# Patient Record
Sex: MALE | Race: WHITE | NOT HISPANIC OR LATINO | Employment: FULL TIME | URBAN - METROPOLITAN AREA
[De-identification: names, ages, dates, MRNs, and addresses within clinical notes are randomized per-mention and may not be internally consistent; named-entity substitution may affect disease eponyms.]

---

## 2017-02-20 ENCOUNTER — OFFICE VISIT (OUTPATIENT)
Dept: FAMILY MEDICINE | Facility: CLINIC | Age: 52
End: 2017-02-20
Payer: COMMERCIAL

## 2017-02-20 VITALS
DIASTOLIC BLOOD PRESSURE: 81 MMHG | SYSTOLIC BLOOD PRESSURE: 132 MMHG | HEIGHT: 75 IN | BODY MASS INDEX: 38.92 KG/M2 | TEMPERATURE: 97.8 F | OXYGEN SATURATION: 97 % | RESPIRATION RATE: 16 BRPM | HEART RATE: 62 BPM | WEIGHT: 313 LBS

## 2017-02-20 DIAGNOSIS — J02.9 SORE THROAT: Primary | ICD-10-CM

## 2017-02-20 DIAGNOSIS — J01.80 OTHER ACUTE SINUSITIS: ICD-10-CM

## 2017-02-20 LAB
DEPRECATED S PYO AG THROAT QL EIA: NORMAL
MICRO REPORT STATUS: NORMAL
SPECIMEN SOURCE: NORMAL

## 2017-02-20 PROCEDURE — 87081 CULTURE SCREEN ONLY: CPT | Performed by: FAMILY MEDICINE

## 2017-02-20 PROCEDURE — 87880 STREP A ASSAY W/OPTIC: CPT | Performed by: FAMILY MEDICINE

## 2017-02-20 PROCEDURE — 99213 OFFICE O/P EST LOW 20 MIN: CPT | Performed by: FAMILY MEDICINE

## 2017-02-20 RX ORDER — ACAMPROSATE CALCIUM 333 MG/1
666 TABLET, DELAYED RELEASE ORAL DAILY
COMMUNITY
End: 2018-12-07

## 2017-02-20 NOTE — MR AVS SNAPSHOT
After Visit Summary   2/20/2017    Kunal Rao    MRN: [id number]           Patient Information     Date Of Birth          1965        Visit Information        Provider Department      2/20/2017 4:30 PM Rosalina Coffman MD [facility] [place]        Today's Diagnoses     Sore throat    -  1    Other acute sinusitis          Care Instructions    Take medications as directed.  Treatment  and symptomatic cares discussed   Follow up if problem or concern           Follow-ups after your visit        Who to contact     If you have questions or need follow up information about today's clinic visit or your schedule please contact [facility] [place] directly at [phone].  Normal or non-critical lab and imaging results will be communicated to you by MyChart, letter or phone within 4 business days after the clinic has received the results. If you do not hear from us within 7 days, please contact the clinic through [employer] or phone. If you have a critical or abnormal lab result, we will notify you by phone as soon as possible.  Submit refill requests through [employer] or call your pharmacy and they will forward the refill request to us. Please allow 3 business days for your refill to be completed.          Additional Information About Your Visit        MyChart Information     [employer] gives you secure access to your electronic health record. If you see a primary care provider, you can also send messages to your care team and make appointments. If you have questions, please call your primary care clinic.  If you do not have a primary care provider, please call [phone] and they will assist you.        Care EveryWhere ID     This is your Care EveryWhere ID. This could be used by other organizations to access your [place] medical records  [id number]        Your Vitals Were     Pulse Temperature Respirations Height Pulse Oximetry BMI (Body Mass Index)    62 97.8  F (36.6  C)  "(Tympanic) 16 6' 3\" (1.905 m) 97% 39.12 kg/m2       Blood Pressure from Last 3 Encounters:   02/20/17 132/81   12/02/16 115/79   03/02/16 154/90    Weight from Last 3 Encounters:   02/20/17 (!) 313 lb (142 kg)   12/02/16 (!) 311 lb (141.1 kg)   03/02/16 (!) 313 lb (142 kg)              We Performed the Following     Strep, Rapid Screen          Today's Medication Changes          These changes are accurate as of: 2/20/17  5:20 PM.  If you have any questions, ask your nurse or doctor.               Start taking these medicines.        Dose/Directions    amoxicillin-clavulanate 875-125 MG per tablet   Commonly known as:  AUGMENTIN   Used for:  Other acute sinusitis   Started by:  Rosalina Coffman MD        Dose:  1 tablet   Take 1 tablet by mouth 2 times daily   Quantity:  28 tablet   Refills:  0            Where to get your medicines      These medications were sent to [id number] - [place] - [address]  [address] [facility] [place] [address]     Phone:  [phone]     amoxicillin-clavulanate 875-125 MG per tablet                Primary Care Provider Office Phone # Fax #    Tracy Rodriguez PA-C [phone]       [facility] [address] [place] [address] [address]  [place] [address]        Thank you!     Thank you for choosing [facility]  for your care. Our goal is always to provide you with excellent care. Hearing back from our patients is one way we can continue to improve our services. Please take a few minutes to complete the written survey that you may receive in the mail after your visit with us. Thank you!             Your Updated Medication List - Protect others around you: Learn how to safely use, store and throw away your medicines at www.disposemymeds.org.          This list is accurate as of: 2/20/17  5:20 PM.  Always use your most recent med list.                   Brand Name Dispense Instructions for use    " ADDERALL 5 MG per tablet   Generic drug:  amphetamine-dextroamphetamine      Take 5 mg by mouth daily Pt may take 10MG. Pt is unsure       amoxicillin-clavulanate 875-125 MG per tablet    AUGMENTIN    28 tablet    Take 1 tablet by mouth 2 times daily       CAMPRAL 333 MG EC tablet   Generic drug:  acamprosate      Take 666 mg by mouth daily       cholecalciferol 1000 UNIT tablet    vitamin D    100 tablet    Take 1 tablet (1,000 Units) by mouth daily       ferrous sulfate 325 (65 FE) MG tablet    IRON    30 tablet    Take 1 tablet (325 mg) by mouth daily (with breakfast)       LEXAPRO 20 MG tablet   Generic drug:  escitalopram      Take 30 mg by mouth daily       omega 3 1000 MG Caps     90 capsule    Take 1 g by mouth daily       VITAMIN C PO      Take 500 mg by mouth daily

## 2017-02-20 NOTE — PROGRESS NOTES
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  Additional history: as documented    Problem list, Medication list, Allergies, and Medical/Social/Surgical histories reviewed in EPIC and updated as appropriate.    ROS:  Constitutional, HEENT, cardiovascular, pulmonary, GI, , musculoskeletal, neuro, skin, endocrine and psych systems are negative, except as otherwise noted.    OBJECTIVE:                                                    /81 (Cuff Size: Adult Large)  Pulse 62  Temp 97.8  F (36.6  C) (Tympanic)  Resp 16  Ht 6' 3\" (1.905 m)  Wt (!) 313 lb (142 kg)  SpO2 97%  BMI 39.12 kg/m2  Body mass index is 39.12 kg/(m^2).  GENERAL: healthy, alert and no distress  EYES: Eyes grossly normal to inspection, conjunctivae and sclerae normal  HENT: ear canals and TM's normal,  mouth without ulcers or lesions and oral mucous membranes moist. Nose with purulent rhinorrhea, Throat with mild pharyngeal erythema, +ve  sinus  Tenderness, more so on rt then left   NECK: no adenopathy,   RESP: lungs clear to auscultation - no rales, rhonchi or wheezes  CV: regular rate and rhythm, normal S1 S2, no S3 or S4, no murmur, click or rub, no peripheral edema and peripheral pulses strong    Diagnostic Test Results:  Strep screen - Negative "     ASSESSMENT/PLAN:                                                      (J02.9) Sore throat  (primary encounter diagnosis)  Comment:   Plan: Strep, Rapid Screen, Beta strep group A culture          Rapid strep negative, strep culture pending. Inform pt  if positive.      (J01.80) Other acute sinusitis  Comment:   Plan: amoxicillin-clavulanate (AUGMENTIN) 875-125 MG         per tablet               URI lingering onto sinusitis.script faxed.  Cares and symptomatic treatment discussed. Follow up if problem.       Patient expressed understanding and agreement with treatment plan. All patient's questions were answered, will let me know if has more later.  Medications: Rx's: Reviewed the potential side effects/complications of medications prescribed.       Rosalina Coffman MD  [facility]

## 2017-02-20 NOTE — NURSING NOTE
"Chief Complaint   Patient presents with     URI       Initial /81 (Cuff Size: Adult Large)  Pulse 62  Temp 97.8  F (36.6  C) (Tympanic)  Resp 16  Ht 6' 3\" (1.905 m)  Wt (!) 313 lb (142 kg)  SpO2 97%  BMI 39.12 kg/m2 Estimated body mass index is 39.12 kg/(m^2) as calculated from the following:    Height as of this encounter: 6' 3\" (1.905 m).    Weight as of this encounter: 313 lb (142 kg).  Medication Reconciliation: complete     Trista Oconnell, CMA    "

## 2017-02-22 LAB
BACTERIA SPEC CULT: NORMAL
MICRO REPORT STATUS: NORMAL
SPECIMEN SOURCE: NORMAL

## 2017-02-22 ASSESSMENT — ANXIETY QUESTIONNAIRES
6. BECOMING EASILY ANNOYED OR IRRITABLE: SEVERAL DAYS
1. FEELING NERVOUS, ANXIOUS, OR ON EDGE: SEVERAL DAYS
3. WORRYING TOO MUCH ABOUT DIFFERENT THINGS: MORE THAN HALF THE DAYS
GAD7 TOTAL SCORE: 6
IF YOU CHECKED OFF ANY PROBLEMS ON THIS QUESTIONNAIRE, HOW DIFFICULT HAVE THESE PROBLEMS MADE IT FOR YOU TO DO YOUR WORK, TAKE CARE OF THINGS AT HOME, OR GET ALONG WITH OTHER PEOPLE: SOMEWHAT DIFFICULT
7. FEELING AFRAID AS IF SOMETHING AWFUL MIGHT HAPPEN: SEVERAL DAYS
2. NOT BEING ABLE TO STOP OR CONTROL WORRYING: NOT AT ALL
5. BEING SO RESTLESS THAT IT IS HARD TO SIT STILL: NOT AT ALL

## 2017-02-22 ASSESSMENT — PATIENT HEALTH QUESTIONNAIRE - PHQ9: 5. POOR APPETITE OR OVEREATING: SEVERAL DAYS

## 2017-02-23 ASSESSMENT — ANXIETY QUESTIONNAIRES: GAD7 TOTAL SCORE: 6

## 2017-02-23 ASSESSMENT — PATIENT HEALTH QUESTIONNAIRE - PHQ9: SUM OF ALL RESPONSES TO PHQ QUESTIONS 1-9: 11

## 2017-10-19 ENCOUNTER — RADIANT APPOINTMENT (OUTPATIENT)
Dept: GENERAL RADIOLOGY | Facility: CLINIC | Age: 52
End: 2017-10-19
Attending: FAMILY MEDICINE
Payer: COMMERCIAL

## 2017-10-19 ENCOUNTER — OFFICE VISIT (OUTPATIENT)
Dept: ORTHOPEDICS | Facility: CLINIC | Age: 52
End: 2017-10-19
Payer: COMMERCIAL

## 2017-10-19 VITALS
WEIGHT: 278 LBS | BODY MASS INDEX: 34.57 KG/M2 | DIASTOLIC BLOOD PRESSURE: 74 MMHG | HEIGHT: 75 IN | SYSTOLIC BLOOD PRESSURE: 118 MMHG

## 2017-10-19 DIAGNOSIS — M25.571 PAIN IN JOINT INVOLVING RIGHT ANKLE AND FOOT: Primary | ICD-10-CM

## 2017-10-19 DIAGNOSIS — M25.571 PAIN IN JOINT INVOLVING RIGHT ANKLE AND FOOT: ICD-10-CM

## 2017-10-19 PROCEDURE — 99203 OFFICE O/P NEW LOW 30 MIN: CPT | Performed by: FAMILY MEDICINE

## 2017-10-19 PROCEDURE — 73630 X-RAY EXAM OF FOOT: CPT | Mod: RT | Performed by: FAMILY MEDICINE

## 2017-10-19 NOTE — PROGRESS NOTES
[facility]   Clinic Visit s Oct 19, 2017    PCP: Tracy Rodriguez Margaret      Kunal is a 52 year old male who is seen as self referral for   Chief Complaint   Patient presents with     Foot Injury       Injury: Reports insidious onset without acute precipitating event. Patient is suspicious that this injury could have been caused by an increase in walking over the summer.     Location of Pain: right heel  Duration of Pain: 6 week(s)  Rating of Pain at worst: 5/10  Rating of Pain Currently: 1/10  Pain is worse with: increased physical activity  Pain is better with: rest  Treatment so far consists of: rest/activity avoidance  Associated symptoms: no distal numbness or tingling; denies swelling or warmth  Prior History of related problems: none    Social History: works at      Past Medical History:   Diagnosis Date     ADD (attention deficit disorder)      Kidney donor     pt donated kidney for transplantation     Major depression, recurrent (H)      THUMB FRACTURE     fracture left thumb, closed reduction       Patient Active Problem List    Diagnosis Date Noted     Donor of kidney for transplant 2016     Priority: Medium     ADD (attention deficit disorder)      Priority: Medium     Major depression, recurrent (H)      Priority: Medium     Elevated serum creatinine 2014     Priority: Medium     Single kidney 08/10/2011     Priority: Medium     CARDIOVASCULAR SCREENING; LDL GOAL LESS THAN 160 10/31/2010     Priority: Medium       Family History   Problem Relation Age of Onset     DIABETES Father 65     Obesity Father      DIABETES Sister 40     Prostate Cancer Paternal Uncle 70     alive     CANCER Paternal Grandmother 80     Pancreatic     CANCER Paternal Uncle 60     Pancreatic     CEREBROVASCULAR DISEASE Mother 82     Obesity Mother      CEREBROVASCULAR DISEASE Maternal Grandfather 82          Other Cancer Maternal Grandmother      C.A.D. No  "family hx of      Cancer - colorectal No family hx of      Breast Cancer No family hx of        Social History     Social History     Marital status:      Spouse name: Brown     Number of children: 3     Years of education: N/A     Occupational History      [employer]     [employer]     Social History Main Topics     Smoking status: Never Smoker     Smokeless tobacco: Never Used     Alcohol use 4.8 - 6.0 oz/week     8 - 10 Standard drinks or equivalent per week      Comment: moderate     Drug use: No     Sexual activity: Not Currently     Partners: Female     Birth control/ protection: Male Surgical, Female Surgical     Other Topics Concern     Parent/Sibling W/ Cabg, Mi Or Angioplasty Before 65f 55m? No     Social History Narrative       Past Surgical History:   Procedure Laterality Date     COLONOSCOPY  6/2011    sigmoid diverticulosis - repeat 10 years     GENITOURINARY SURGERY      Vasectomy     LAPAROSCOPIC DONOR HAND ASSISTED KIDNEY LIVING RELATED  7/7/2011    Procedure:LAPAROSCOPIC DONOR HAND ASSISTED KIDNEY LIVING RELATED; RIGHT KIDNEY, ON-Q PUMP PLACEMENT; Surgeon:RUSSELL CHARLES; Location:[facility]     ORTHOPEDIC SURGERY  2000    closed reduction L thumb     SURGICAL HISTORY OF -       reduction of thumb fracture       Review of Systems   Musculoskeletal: Positive for joint pain.   All other systems reviewed and are negative.        Physical Exam   Musculoskeletal:        Left ankle: Normal.     /74  Ht 6' 3\" (1.905 m)  Wt 278 lb (126.1 kg)  BMI 34.75 kg/m2  Constitutional:well-developed, well-nourished, and in no distress.   Cardiovascular: Intact distal pulses.    Neurological: alert. Gait Normal:   Gait, station, stance, and balance appear normal for age  Skin: Skin is warm and dry.   Psychiatric: Mood and affect normal.   Respiratory: unlabored, speaks in full sentences  Lymph: no LAD, no lymphangitis    Left Ankle Exam   Left ankle exam is " normal.    Tenderness   None    Range of Motion   Normal left ankle ROM    Muscle Strength   Normal left ankle strength    Right Ankle Exam   Swelling: None    Tenderness   None    Range of Motion   Dorsiflexion:     0  Plantar flexion: Normal  Inversion:         Normal  Eversion:         Normal    Muscle Strength   Dorsiflexion:         5/5  Plantar flexion:     5/5  Anterior tibial:       5/5  Posterior tibial:     5/5  Gastrosoleus:      5/5  Peroneal muscle: 5/5    Tests   Anterior drawer: Negative  Varus tilt: Negative          X-ray images Ordered and independently reviewed by me in the office today with the patient. X-ray shows:   Recent Results (from the past 48 hour(s))   XR Foot Right G/E 3 Views    Narrative    10/19/2017    normal mortise, no loose bodies, no fractures seen       ASSESSMENT/PLAN    ICD-10-CM    1. Pain in joint involving right ankle and foot M25.571 XR Foot Right G/E 3 Views     DDx includes stress fx of calcaneous, atypical plantar fasciitis, fat pad syndrome. Unable to elicit symptoms on exam today, no PF findings. MRI discussed, will try offloading foot first with OTC arch supports, instructed in heel cord stretching, may call for MRI if not resolving at pt;s discretion.

## 2017-10-19 NOTE — MR AVS SNAPSHOT
"              After Visit Summary   10/19/2017    Kunal Rao    MRN: [id number]           Patient Information     Date Of Birth          1965        Visit Information        Provider Department      10/19/2017 10:20 AM Ramez Garrido MD [facility]        Today's Diagnoses     Pain in joint involving right ankle and foot    -  1       Follow-ups after your visit        Who to contact     If you have questions or need follow up information about today's clinic visit or your schedule please contact [facility] directly at [phone].  Normal or non-critical lab and imaging results will be communicated to you by MyChart, letter or phone within 4 business days after the clinic has received the results. If you do not hear from us within 7 days, please contact the clinic through [employer] or phone. If you have a critical or abnormal lab result, we will notify you by phone as soon as possible.  Submit refill requests through [employer] or call your pharmacy and they will forward the refill request to us. Please allow 3 business days for your refill to be completed.          Additional Information About Your Visit        MyChart Information     [employer] gives you secure access to your electronic health record. If you see a primary care provider, you can also send messages to your care team and make appointments. If you have questions, please call your primary care clinic.  If you do not have a primary care provider, please call [phone] and they will assist you.        Care EveryWhere ID     This is your Care EveryWhere ID. This could be used by other organizations to access your [facility] medical records  [id number]        Your Vitals Were     Height BMI (Body Mass Index)                6' 3\" (1.905 m) 34.75 kg/m2           Blood Pressure from Last 3 Encounters:   10/19/17 118/74   02/20/17 132/81   12/02/16 " 115/79    Weight from Last 3 Encounters:   10/19/17 278 lb (126.1 kg)   02/20/17 (!) 313 lb (142 kg)   12/02/16 (!) 311 lb (141.1 kg)               Primary Care Provider Office Phone # Fax #    Tracy Margaret Rodriguez PA-C [phone]        [facility] [address]  [place] [address]        Equal Access to Services     KAYE XIONG : Hadii aad ku hadasho Soomaali, waaxda luqadaha, qaybta kaalmada adeegyada, waxay idiin hayaan adeeg kharash la'aan . So [facility] [place] [facility] [phone].    ATENCIÓN: Si habla español, tiene a allen disposición servicios gratuitos de asistencia lingüística. [facility] [phone].    We comply with applicable federal civil rights laws and [place] laws. We do not discriminate on the basis of race, color, national origin, age, disability, sex, sexual orientation, or gender identity.            Thank you!     Thank you for choosing [place] [facility]  for your care. Our goal is always to provide you with excellent care. Hearing back from our patients is one way we can continue to improve our services. Please take a few minutes to complete the written survey that you may receive in the mail after your visit with us. Thank you!             Your Updated Medication List - Protect others around you: Learn how to safely use, store and throw away your medicines at www.disposemymeds.org.          This list is accurate as of: 10/19/17  2:56 PM.  Always use your most recent med list.                   Brand Name Dispense Instructions for use Diagnosis    ADDERALL 5 MG per tablet   Generic drug:  amphetamine-dextroamphetamine      Take 5 mg by mouth daily Pt may take 10MG. Pt is unsure        CAMPRAL 333 MG EC tablet   Generic drug:  acamprosate      Take 666 mg by mouth daily        cholecalciferol 1000 UNIT tablet    vitamin D    100 tablet    Take 1 tablet (1,000 Units) by mouth daily        ferrous sulfate 325 (65 FE) MG tablet    IRON    30 tablet    Take 1  tablet (325 mg) by mouth daily (with breakfast)        LEXAPRO 20 MG tablet   Generic drug:  escitalopram      Take 30 mg by mouth daily        omega 3 1000 MG Caps     90 capsule    Take 1 g by mouth daily        vitamin B complex with vitamin C Tabs tablet      Take 1 tablet by mouth daily        VITAMIN C PO      Take 500 mg by mouth daily

## 2017-10-19 NOTE — NURSING NOTE
"Chief Complaint   Patient presents with     Foot Injury       Initial /74  Ht 6' 3\" (1.905 m)  Wt 278 lb (126.1 kg)  BMI 34.75 kg/m2 Estimated body mass index is 34.75 kg/(m^2) as calculated from the following:    Height as of this encounter: 6' 3\" (1.905 m).    Weight as of this encounter: 278 lb (126.1 kg).  Medication Reconciliation: complete    "

## 2018-12-07 ENCOUNTER — OFFICE VISIT (OUTPATIENT)
Dept: FAMILY MEDICINE | Facility: CLINIC | Age: 53
End: 2018-12-07
Payer: COMMERCIAL

## 2018-12-07 VITALS
DIASTOLIC BLOOD PRESSURE: 74 MMHG | HEIGHT: 74 IN | OXYGEN SATURATION: 94 % | SYSTOLIC BLOOD PRESSURE: 114 MMHG | HEART RATE: 60 BPM | BODY MASS INDEX: 38.76 KG/M2 | TEMPERATURE: 98.9 F | WEIGHT: 302 LBS

## 2018-12-07 DIAGNOSIS — F98.8 ATTENTION DEFICIT DISORDER, UNSPECIFIED HYPERACTIVITY PRESENCE: ICD-10-CM

## 2018-12-07 DIAGNOSIS — Z00.00 ENCOUNTER FOR ROUTINE ADULT HEALTH EXAMINATION WITHOUT ABNORMAL FINDINGS: ICD-10-CM

## 2018-12-07 DIAGNOSIS — E66.9 OBESITY, UNSPECIFIED CLASSIFICATION, UNSPECIFIED OBESITY TYPE, UNSPECIFIED WHETHER SERIOUS COMORBIDITY PRESENT: ICD-10-CM

## 2018-12-07 DIAGNOSIS — R79.89 ELEVATED SERUM CREATININE: ICD-10-CM

## 2018-12-07 DIAGNOSIS — Z90.5 SINGLE KIDNEY: ICD-10-CM

## 2018-12-07 DIAGNOSIS — J01.90 ACUTE SINUSITIS WITH SYMPTOMS > 10 DAYS: ICD-10-CM

## 2018-12-07 DIAGNOSIS — Z13.6 CARDIOVASCULAR SCREENING; LDL GOAL LESS THAN 160: ICD-10-CM

## 2018-12-07 DIAGNOSIS — Z23 NEED FOR VACCINATION: ICD-10-CM

## 2018-12-07 DIAGNOSIS — F33.9 RECURRENT MAJOR DEPRESSIVE DISORDER, REMISSION STATUS UNSPECIFIED (H): ICD-10-CM

## 2018-12-07 DIAGNOSIS — Z23 NEED FOR PROPHYLACTIC VACCINATION AND INOCULATION AGAINST INFLUENZA: Primary | ICD-10-CM

## 2018-12-07 DIAGNOSIS — Z52.4 DONOR OF KIDNEY FOR TRANSPLANT: ICD-10-CM

## 2018-12-07 PROCEDURE — 90472 IMMUNIZATION ADMIN EACH ADD: CPT | Performed by: PHYSICIAN ASSISTANT

## 2018-12-07 PROCEDURE — 90682 RIV4 VACC RECOMBINANT DNA IM: CPT | Performed by: PHYSICIAN ASSISTANT

## 2018-12-07 PROCEDURE — 99213 OFFICE O/P EST LOW 20 MIN: CPT | Mod: 25 | Performed by: PHYSICIAN ASSISTANT

## 2018-12-07 PROCEDURE — 80061 LIPID PANEL: CPT | Performed by: PHYSICIAN ASSISTANT

## 2018-12-07 PROCEDURE — 36415 COLL VENOUS BLD VENIPUNCTURE: CPT | Performed by: PHYSICIAN ASSISTANT

## 2018-12-07 PROCEDURE — 90750 HZV VACC RECOMBINANT IM: CPT | Performed by: PHYSICIAN ASSISTANT

## 2018-12-07 PROCEDURE — 99396 PREV VISIT EST AGE 40-64: CPT | Mod: 25 | Performed by: PHYSICIAN ASSISTANT

## 2018-12-07 PROCEDURE — 90471 IMMUNIZATION ADMIN: CPT | Performed by: PHYSICIAN ASSISTANT

## 2018-12-07 PROCEDURE — 80053 COMPREHEN METABOLIC PANEL: CPT | Performed by: PHYSICIAN ASSISTANT

## 2018-12-07 NOTE — PROGRESS NOTES
Injectable Influenza Immunization Documentation    1.  Is the person to be vaccinated sick today?   No    2. Does the person to be vaccinated have an allergy to a component   of the vaccine?   No  Egg Allergy Algorithm Link    3. Has the person to be vaccinated ever had a serious reaction   to influenza vaccine in the past?   No    4. Has the person to be vaccinated ever had Guillain-Barré syndrome?   No    Form completed by Cris Murray MA            SUBJECTIVE:   CC: Kunal Rao is an 53 year old male who presents for preventive health visit.     Healthy Habits:    Do you get at least three servings of calcium containing foods daily (dairy, green leafy vegetables, etc.)? yes    Amount of exercise or daily activities, outside of work: 3 to 4 days a week     Problems taking medications regularly No    Medication side effects: No    Have you had an eye exam in the past two years? no    Do you see a dentist twice per year? yes    Do you have sleep apnea, excessive snoring or daytime drowsiness?yes snoring       URI symptoms:  2-3 week hx of nasal congestion, slight cough and body aches, and some sinus pressure.  Symptoms seemed to improve last week but then again worsened a few days ago.  He has not tried anything for his symptoms    Today's PHQ-2 Score:   PHQ-2 ( 1999 Pfizer) 12/5/2018 2/20/2017   Q1: Little interest or pleasure in doing things 3 1   Q2: Feeling down, depressed or hopeless 3 1   PHQ-2 Score 6 2   Q1: Little interest or pleasure in doing things Nearly every day -   Q2: Feeling down, depressed or hopeless Nearly every day -   PHQ-2 Score 6 -       Abuse: Current or Past(Physical, Sexual or Emotional)- No  Do you feel safe in your environment? Yes    Social History   Substance Use Topics     Smoking status: Never Smoker     Smokeless tobacco: Never Used     Alcohol use 4.8 - 6.0 oz/week     8 - 10 Standard drinks or equivalent per week      Comment: moderate     If you drink alcohol do  you typically have >3 drinks per day or >7 drinks per week? No                      Last PSA: No results found for: PSA    Reviewed orders with patient. Reviewed health maintenance and updated orders accordingly - Yes  Patient Active Problem List   Diagnosis     CARDIOVASCULAR SCREENING; LDL GOAL LESS THAN 160     Single kidney     Elevated serum creatinine     Donor of kidney for transplant     ADD (attention deficit disorder)     Major depression, recurrent (H)     Past Surgical History:   Procedure Laterality Date     COLONOSCOPY  2011    sigmoid diverticulosis - repeat 10 years     GENITOURINARY SURGERY      Vasectomy     LAPAROSCOPIC DONOR HAND ASSISTED KIDNEY LIVING RELATED  2011    Procedure:LAPAROSCOPIC DONOR HAND ASSISTED KIDNEY LIVING RELATED; RIGHT KIDNEY, ON-Q PUMP PLACEMENT; Surgeon:RUSSELL CHARLES; Location:[facility]     ORTHOPEDIC SURGERY      closed reduction L thumb     SURGICAL HISTORY OF -       reduction of thumb fracture       Social History   Substance Use Topics     Smoking status: Never Smoker     Smokeless tobacco: Never Used     Alcohol use 4.8 - 6.0 oz/week     8 - 10 Standard drinks or equivalent per week      Comment: moderate     Family History   Problem Relation Age of Onset     Diabetes Father 65     Obesity Father      Diabetes Sister 40     Prostate Cancer Paternal Uncle 70     alive     Cancer Paternal Grandmother 80     Pancreatic     Cancer Paternal Uncle 60     Pancreatic     Cerebrovascular Disease Mother 82     Obesity Mother      Cerebrovascular Disease Maternal Grandfather 82          Other Cancer Maternal Grandmother      C.A.D. No family hx of      Cancer - colorectal No family hx of      Breast Cancer No family hx of          Current Outpatient Prescriptions   Medication Sig Dispense Refill     amoxicillin-clavulanate (AUGMENTIN) 875-125 MG tablet Take 1 tablet by mouth 2 times daily for 7 days 14 tablet 0     amphetamine-dextroamphetamine (ADDERALL) 5 MG  tablet Take 5 mg by mouth daily Pt may take 10MG. Pt is unsure       Ascorbic Acid (VITAMIN C PO) Take 500 mg by mouth daily       cholecalciferol (VITAMIN D) 1000 UNIT tablet Take 1 tablet (1,000 Units) by mouth daily 100 tablet 3     escitalopram (LEXAPRO) 20 MG tablet Take 30 mg by mouth daily       ferrous sulfate 325 (65 FE) MG tablet Take 1 tablet (325 mg) by mouth daily (with breakfast) 30 tablet 2     omega 3 1000 MG CAPS Take 1 g by mouth daily 90 capsule      vitamin B complex with vitamin C (VITAMIN  B COMPLEX) TABS tablet Take 1 tablet by mouth daily       No Known Allergies  Recent Labs   Lab Test  03/02/16   1352  06/18/14   1128   06/24/11   0639   LDL   --    --    --   107   HDL   --    --    --   54   TRIG   --    --    --   71   ALT  36  25   --   26   CR  1.41*  1.54*   < >  1.02   GFRESTIMATED  53*  49*   < >  79   GFRESTBLACK  65  59*   < >  >90   POTASSIUM  4.0  4.2   < >  4.2   TSH   --   1.92   --    --     < > = values in this interval not displayed.        Reviewed and updated as needed this visit by clinical staff  Tobacco  Allergies  Meds  Med Hx  Surg Hx  Fam Hx  Soc Hx        Reviewed and updated as needed this visit by Provider  Tobacco  Med Hx  Surg Hx  Fam Hx  Soc Hx       Past Medical History:   Diagnosis Date     ADD (attention deficit disorder)      Kidney donor 2011    pt donated kidney for transplantation     Major depression, recurrent (H) 1980's     THUMB FRACTURE     fracture left thumb, closed reduction      Past Surgical History:   Procedure Laterality Date     COLONOSCOPY  6/2011    sigmoid diverticulosis - repeat 10 years     GENITOURINARY SURGERY      Vasectomy     LAPAROSCOPIC DONOR HAND ASSISTED KIDNEY LIVING RELATED  7/7/2011    Procedure:LAPAROSCOPIC DONOR HAND ASSISTED KIDNEY LIVING RELATED; RIGHT KIDNEY, ON-Q PUMP PLACEMENT; Surgeon:RUSSELL CHARLES; Location:[facility]     ORTHOPEDIC SURGERY  2000    closed reduction L thumb     SURGICAL HISTORY OF -        "reduction of thumb fracture            ROS:  CONSTITUTIONAL: NEGATIVE for fever, chills, change in weight  INTEGUMENTARY/SKIN: NEGATIVE for worrisome rashes, moles or lesions  EYES: NEGATIVE for vision changes or irritation  ENT: NEGATIVE for ear, mouth and throat problems  RESP: NEGATIVE for significant cough or SOB  CV: NEGATIVE for chest pain, palpitations or peripheral edema  GI: NEGATIVE for nausea, abdominal pain, heartburn, or change in bowel habits   male: negative for dysuria, hematuria, decreased urinary stream, erectile dysfunction, urethral discharge  MUSCULOSKELETAL: NEGATIVE for significant arthralgias or myalgia  NEURO: NEGATIVE for weakness, dizziness or paresthesias  PSYCHIATRIC: NEGATIVE for changes in mood or affect    OBJECTIVE:   /74  Pulse 60  Temp 98.9  F (37.2  C) (Oral)  Ht 6' 2\" (1.88 m)  Wt 302 lb (137 kg)  SpO2 94%  BMI 38.77 kg/m2  EXAM:  GENERAL: healthy, alert and no distress  EYES: Eyes grossly normal to inspection, PERRL and conjunctivae and sclerae normal  HENT: ear canals and TM's normal, nose and mouth without ulcers or lesions  NECK: no adenopathy, no asymmetry, masses, or scars and thyroid normal to palpation  RESP: lungs clear to auscultation - no rales, rhonchi or wheezes  CV: regular rate and rhythm, normal S1 S2, no S3 or S4, no murmur, click or rub  ABDOMEN: soft, nontender, no hepatosplenomegaly, no masses and bowel sounds normal  MS: no gross musculoskeletal defects noted, no edema  SKIN: no suspicious lesions or rashes  NEURO: Normal strength and tone, mentation intact and speech normal  PSYCH: mentation appears normal, affect normal/bright    Diagnostic Test Results:  none     ASSESSMENT/PLAN:   1. Encounter for routine adult health examination without abnormal findings    2. Recurrent major depressive disorder, remission status unspecified (H)    3. Attention deficit disorder, unspecified hyperactivity presence  Followed by psychiatry    4. Single " "kidney  Previously followed by nephrology, he was told he no longer needed to follow with them.  Will check CR today  - Comprehensive metabolic panel (BMP + Alb, Alk Phos, ALT, AST, Total. Bili, TP)    5. Elevated serum creatinine  Hx of following nephrectomy  - Comprehensive metabolic panel (BMP + Alb, Alk Phos, ALT, AST, Total. Bili, TP)    6. Donor of kidney for transplant      7. Need for prophylactic vaccination and inoculation against influenza  - Vaccine Administration, Initial [75177]  - FLU VACCINE, (RIV4) RECOMBINANT HA  , IM (FluBlok, egg free) [92177]- >18 YRS (FM recommended  50-64 YRS)    8. CARDIOVASCULAR SCREENING; LDL GOAL LESS THAN 16  - Lipid Profile (Chol, Trig, HDL, LDL calc)    9. Acute sinusitis with symptoms > 10 days  Advised that he use Flonase BID for symptoms improvement.  If no improvement in 48-72 hours he may start Augmentin.  Script printed today  - amoxicillin-clavulanate (AUGMENTIN) 875-125 MG tablet; Take 1 tablet by mouth 2 times daily for 7 days  Dispense: 14 tablet; Refill: 0    10. Need for vaccination  - 1st  Administration  [[id number]]  - SHINGRIX [88425]    COUNSELING:  Reviewed preventive health counseling, as reflected in patient instructions       Regular exercise       Healthy diet/nutrition    BP Readings from Last 1 Encounters:   12/07/18 114/74     Estimated body mass index is 38.77 kg/(m^2) as calculated from the following:    Height as of this encounter: 6' 2\" (1.88 m).    Weight as of this encounter: 302 lb (137 kg).      Weight management plan: Discussed healthy diet and exercise guidelines     reports that he has never smoked. He has never used smokeless tobacco.      Counseling Resources:  ATP IV Guidelines  Pooled Cohorts Equation Calculator  FRAX Risk Assessment  ICSI Preventive Guidelines  Dietary Guidelines for Americans, 2010  USDA's MyPlate  ASA Prophylaxis  Lung CA Screening    Tex Goodwin PA-C  [facility] [place]  "

## 2018-12-07 NOTE — MR AVS SNAPSHOT
After Visit Summary   12/7/2018    Kunal Rao    MRN: [id number]           Patient Information     Date Of Birth          1965        Visit Information        Provider Department      12/7/2018 10:40 AM Tex Goodwin PA-C [facility]        Today's Diagnoses     Need for prophylactic vaccination and inoculation against influenza    -  1    Encounter for routine adult health examination without abnormal findings        Recurrent major depressive disorder, remission status unspecified (H)        Attention deficit disorder, unspecified hyperactivity presence        Single kidney        Elevated serum creatinine        Donor of kidney for transplant        Obesity, unspecified classification, unspecified obesity type, unspecified whether serious comorbidity present        CARDIOVASCULAR SCREENING; LDL GOAL LESS THAN 160        Acute sinusitis with symptoms > 10 days        Need for vaccination          Care Instructions      Preventive Health Recommendations  Male Ages 50 - 64    Yearly exam:             See your health care provider every year in order to  o   Review health changes.   o   Discuss preventive care.    o   Review your medicines if your doctor has prescribed any.     Have a cholesterol test every 5 years, or more frequently if you are at risk for high cholesterol/heart disease.     Have a diabetes test (fasting glucose) every three years. If you are at risk for diabetes, you should have this test more often.     Have a colonoscopy at age 50, or have a yearly FIT test (stool test). These exams will check for colon cancer.      Talk with your health care provider about whether or not a prostate cancer screening test (PSA) is right for you.    You should be tested each year for STDs (sexually transmitted diseases), if you re at risk.     Shots: Get a flu shot each year. Get a tetanus shot every 10 years.     Nutrition:    Eat at least 5 servings of fruits  and vegetables daily.     Eat whole-grain bread, whole-wheat pasta and brown rice instead of white grains and rice.     Get adequate Calcium and Vitamin D.     Lifestyle    Exercise for at least 150 minutes a week (30 minutes a day, 5 days a week). This will help you control your weight and prevent disease.     Limit alcohol to one drink per day.     No smoking.     Wear sunscreen to prevent skin cancer.     See your dentist every six months for an exam and cleaning.     See your eye doctor every 1 to 2 years.            Follow-ups after your visit        Follow-up notes from your care team     Return in about 1 year (around 12/7/2019) for Physical Exam.      Who to contact     If you have questions or need follow up information about today's clinic visit or your schedule please contact [facility] [place] directly at [phone].  Normal or non-critical lab and imaging results will be communicated to you by [employer], letter or phone within 4 business days after the clinic has received the results. If you do not hear from us within 7 days, please contact the clinic through [employer] or phone. If you have a critical or abnormal lab result, we will notify you by phone as soon as possible.  Submit refill requests through [employer] or call your pharmacy and they will forward the refill request to us. Please allow 3 business days for your refill to be completed.          Additional Information About Your Visit        [employer] Information     [employer] gives you secure access to your electronic health record. If you see a primary care provider, you can also send messages to your care team and make appointments. If you have questions, please call your primary care clinic.  If you do not have a primary care provider, please call [phone] and they will assist you.        Care EveryWhere ID     This is your Care EveryWhere ID. This could be used by other organizations to access your [facility]  "records  [id number]        Your Vitals Were     Pulse Temperature Height Pulse Oximetry BMI (Body Mass Index)       60 98.9  F (37.2  C) (Oral) 6' 2\" (1.88 m) 94% 38.77 kg/m2        Blood Pressure from Last 3 Encounters:   12/07/18 114/74   10/19/17 118/74   02/20/17 132/81    Weight from Last 3 Encounters:   12/07/18 302 lb (137 kg)   10/19/17 278 lb (126.1 kg)   02/20/17 (!) 313 lb (142 kg)              We Performed the Following     1st  Administration  [[id number]]     Comprehensive metabolic panel (BMP + Alb, Alk Phos, ALT, AST, Total. Bili, TP)     DEPRESSION ACTION PLAN (DAP)     FLU VACCINE, (RIV4) RECOMBINANT HA  , IM (FluBlok, egg free) [77933]- >18 YRS (FMG recommended  50-64 YRS)     Lipid Profile (Chol, Trig, HDL, LDL calc)     OFFICE/OUTPT VISIT,EST,LEVL III     SHINGRIX [[id number]]     Vaccine Administration, Initial [[id number]]          Today's Medication Changes          These changes are accurate as of 12/7/18 12:13 PM.  If you have any questions, ask your nurse or doctor.               Start taking these medicines.        Dose/Directions    amoxicillin-clavulanate 875-125 MG tablet   Commonly known as:  AUGMENTIN   Used for:  Acute sinusitis with symptoms > 10 days   Started by:  Tex Goodwin PA-C        Dose:  1 tablet   Take 1 tablet by mouth 2 times daily for 7 days   Quantity:  14 tablet   Refills:  0            Where to get your medicines      Some of these will need a paper prescription and others can be bought over the counter.  Ask your nurse if you have questions.     Bring a paper prescription for each of these medications     amoxicillin-clavulanate 875-125 MG tablet                Primary Care Provider Office Phone # Fax #    [facility] [phone]       [address] [address]        Equal Access to Services     KAYE XIONG AH: Roby Barrow, kal luqregina, qaybbal kaaltaylor miranda, leslee morales " danielshaiyanet rubiaajumana ah. So [facility] [phone].    ATENCIÓN: Si logan alvarado, tiene a allen disposición servicios gratuitos de asistencia lingüística. [facility] camejo [phone].    We comply with applicable federal civil rights laws and [place] laws. We do not discriminate on the basis of race, color, national origin, age, disability, sex, sexual orientation, or gender identity.            Thank you!     Thank you for choosing [facility] [place]  for your care. Our goal is always to provide you with excellent care. Hearing back from our patients is one way we can continue to improve our services. Please take a few minutes to complete the written survey that you may receive in the mail after your visit with us. Thank you!             Your Updated Medication List - Protect others around you: Learn how to safely use, store and throw away your medicines at www.disposemymeds.org.          This list is accurate as of 12/7/18 12:13 PM.  Always use your most recent med list.                   Brand Name Dispense Instructions for use Diagnosis    ADDERALL 5 MG tablet   Generic drug:  amphetamine-dextroamphetamine      Take 5 mg by mouth daily Pt may take 10MG. Pt is unsure        amoxicillin-clavulanate 875-125 MG tablet    AUGMENTIN    14 tablet    Take 1 tablet by mouth 2 times daily for 7 days    Acute sinusitis with symptoms > 10 days       ferrous sulfate 325 (65 Fe) MG tablet    FEROSUL    30 tablet    Take 1 tablet (325 mg) by mouth daily (with breakfast)        LEXAPRO 20 MG tablet   Generic drug:  escitalopram      Take 30 mg by mouth daily        omega 3 1000 MG Caps     90 capsule    Take 1 g by mouth daily        vitamin B complex with vitamin C tablet      Take 1 tablet by mouth daily        VITAMIN C PO      Take 500 mg by mouth daily        vitamin D3 1000 units (25 mcg) tablet    CHOLECALCIFEROL    100 tablet    Take 1 tablet (1,000 Units) by mouth daily

## 2018-12-07 NOTE — LETTER
My Depression Action Plan  Name: Kunal Rao   Date of Birth 1965  Date: 12/7/2018    My doctor: [facility] [place]   [facility]  [address]  [place] [address]  [phone]          GREEN    ZONE   Good Control    What it looks like:     Things are going generally well. You have normal up s and down s. You may even feel depressed from time to time, but bad moods usually last less than a day.   What you need to do:  1. Continue to care for yourself (see self care plan)  2. Check your depression survival kit and update it as needed  3. Follow your physician s recommendations including any medication.  4. Do not stop taking medication unless you consult with your physician first.           YELLOW         ZONE Getting Worse    What it looks like:     Depression is starting to interfere with your life.     It may be hard to get out of bed; you may be starting to isolate yourself from others.    Symptoms of depression are starting to last most all day and this has happened for several days.     You may have suicidal thoughts but they are not constant.   What you need to do:     1. Call your care team, your response to treatment will improve if you keep your care team informed of your progress. Yellow periods are signs an adjustment may need to be made.     2. Continue your self-care, even if you have to fake it!    3. Talk to someone in your support network    4. Open up your depression survival kit           RED    ZONE Medical Alert - Get Help    What it looks like:     Depression is seriously interfering with your life.     You may experience these or other symptoms: You can t get out of bed most days, can t work or engage in other necessary activities, you have trouble taking care of basic hygiene, or basic responsibilities, thoughts of suicide or death that will not go away, self-injurious behavior.     What you need to  do:  1. Call your care team and request a same-day appointment. If they are not available (weekends or after hours) call your local crisis line, emergency room or 911.            Depression Self Care Plan / Survival Kit    Self-Care for Depression  Here s the deal. Your body and mind are really not as separate as most people think.  What you do and think affects how you feel and how you feel influences what you do and think. This means if you do things that people who feel good do, it will help you feel better.  Sometimes this is all it takes.  There is also a place for medication and therapy depending on how severe your depression is, so be sure to consult with your medical provider and/ or Behavioral Health Consultant if your symptoms are worsening or not improving.     In order to better manage my stress, I will:    Exercise  Get some form of exercise, every day. This will help reduce pain and release endorphins, the  feel good  chemicals in your brain. This is almost as good as taking antidepressants!  This is not the same as joining a gym and then never going! (they count on that by the way ) It can be as simple as just going for a walk or doing some gardening, anything that will get you moving.      Hygiene   Maintain good hygiene (Get out of bed in the morning, Make your bed, Brush your teeth, Take a shower, and Get dressed like you were going to work, even if you are unemployed).  If your clothes don't fit try to get ones that do.    Diet  I will strive to eat foods that are good for me, drink plenty of water, and avoid excessive sugar, caffeine, alcohol, and other mood-altering substances.  Some foods that are helpful in depression are: complex carbohydrates, B vitamins, flaxseed, fish or fish oil, fresh fruits and vegetables.    Psychotherapy  I agree to participate in Individual Therapy (if recommended).    Medication  If prescribed medications, I agree to take them.  Missing doses can result in serious  side effects.  I understand that drinking alcohol, or other illicit drug use, may cause potential side effects.  I will not stop my medication abruptly without first discussing it with my provider.    Staying Connected With Others  I will stay in touch with my friends, family members, and my primary care provider/team.    Use your imagination  Be creative.  We all have a creative side; it doesn t matter if it s oil painting, sand castles, or mud pies! This will also kick up the endorphins.    Witness Beauty  (AKA stop and smell the roses) Take a look outside, even in mid-winter. Notice colors, textures. Watch the squirrels and birds.     Service to others  Be of service to others.  There is always someone else in need.  By helping others we can  get out of ourselves  and remember the really important things.  This also provides opportunities for practicing all the other parts of the program.    Humor  Laugh and be silly!  Adjust your TV habits for less news and crime-drama and more comedy.    Control your stress  Try breathing deep, massage therapy, biofeedback, and meditation. Find time to relax each day.     My support system    Clinic Contact:  Phone number:    Contact 1:  Phone number:    Contact 2:  Phone number:    Mormonism/:  Phone number:    Therapist:  Phone number:    Local crisis center:    Phone number:    Other community support:  Phone number:

## 2018-12-08 LAB
ALBUMIN SERPL-MCNC: 4 G/DL (ref 3.4–5)
ALP SERPL-CCNC: 75 U/L (ref 40–150)
ALT SERPL W P-5'-P-CCNC: 40 U/L (ref 0–70)
ANION GAP SERPL CALCULATED.3IONS-SCNC: 8 MMOL/L (ref 3–14)
AST SERPL W P-5'-P-CCNC: 31 U/L (ref 0–45)
BILIRUB SERPL-MCNC: 0.4 MG/DL (ref 0.2–1.3)
BUN SERPL-MCNC: 17 MG/DL (ref 7–30)
CALCIUM SERPL-MCNC: 9.2 MG/DL (ref 8.5–10.1)
CHLORIDE SERPL-SCNC: 105 MMOL/L (ref 94–109)
CHOLEST SERPL-MCNC: 184 MG/DL
CO2 SERPL-SCNC: 27 MMOL/L (ref 20–32)
CREAT SERPL-MCNC: 1.39 MG/DL (ref 0.66–1.25)
GFR SERPL CREATININE-BSD FRML MDRD: 54 ML/MIN/1.7M2
GLUCOSE SERPL-MCNC: 104 MG/DL (ref 70–99)
HDLC SERPL-MCNC: 49 MG/DL
LDLC SERPL CALC-MCNC: 117 MG/DL
NONHDLC SERPL-MCNC: 135 MG/DL
POTASSIUM SERPL-SCNC: 4.8 MMOL/L (ref 3.4–5.3)
PROT SERPL-MCNC: 7.4 G/DL (ref 6.8–8.8)
SODIUM SERPL-SCNC: 140 MMOL/L (ref 133–144)
TRIGL SERPL-MCNC: 88 MG/DL

## 2018-12-10 NOTE — RESULT ENCOUNTER NOTE
Kunal-  Here are your recent results.       -Cholesterol levels (LDL,HDL, Triglycerides) are okay.  ADVISE: rechecking  in 1 year.  -Liver and gallbladder tests (ALT,AST, Alk phos,bilirubin) are normal.  -Kidney function (GFR) is decreased, but improved from your previous readings.  We will keep monitoring this every 6 months  -Sodium is normal.  -Potassium is normal.  -Calcium is normal.  -Glucose is slightly elevated. ADVISE:: eating a low carbohydrate diet, exercising, trying to lose weight and rechecking your glucose level in 12 months.

## 2019-05-09 ENCOUNTER — TELEPHONE (OUTPATIENT)
Dept: FAMILY MEDICINE | Facility: CLINIC | Age: 54
End: 2019-05-09

## 2019-05-09 NOTE — LETTER
May 16, 2019      Kunal Rao  [address] [facility] [address]        Dear Kunal,    I care about your health and have reviewed your health plan. I have reviewed your medical conditions, medication list, and lab results and am making recommendations based on this review, to better manage your health.    You are in particular need of attention regarding:  -Depression    I am recommending that you:  -Please complete the enclosed PHQ9 and return in the enclosed envelope    Here is a list of Health Maintenance topics that are due now or due soon:  Health Maintenance Due   Topic Date Due     Zoster (Shingles) Vaccine (2 of 2) 02/01/2019     Depression Assessment - every 6 months  06/07/2019       Please call us at [phone] (or use [email or web address]) to address the above recommendations.     Thank you for trusting [facility] and we appreciate the opportunity to serve you.  We look forward to supporting your healthcare needs in the future.    Healthy Regards,    Tex Goodwin PA-C

## 2019-05-09 NOTE — TELEPHONE ENCOUNTER
Pt is due now to update PHQ9.  mychart message sent to pt. Follow up end date 8/4/19.   PHQ-9 SCORE 2/22/2017 12/5/2018   PHQ-9 Total Score MyChart - 17 (Moderately severe depression)   PHQ-9 Total Score 11 17     Sergio OBREGON, IVONNE

## 2019-05-14 NOTE — TELEPHONE ENCOUNTER
Non detailed message left for pt to return call to clinic and ask to speak with a triage nurse.    Viridiana BELTRAN RN  EP Triage

## 2019-05-14 NOTE — TELEPHONE ENCOUNTER
Pt has not responded to [employer] message, please call pt and update phq 9.Sergio OBREGON, CMA

## 2019-05-15 NOTE — TELEPHONE ENCOUNTER
2nd attempt.  Non detailed message left for pt to return call to clinic and ask to speak with a triage nurse.    Viridiana BELTRAN RN  EP Triage

## 2019-05-16 NOTE — TELEPHONE ENCOUNTER
3rd attempt.  Non detailed message left for pt to return call to clinic and ask to speak with a triage nurse.    Routing to TC to mail PHQ 9 to home address.  Last saw Tex Goodwin for physical in Dec 2018.    Viridiana BELTRAN RN  EP Triage

## 2019-10-03 ENCOUNTER — HEALTH MAINTENANCE LETTER (OUTPATIENT)
Age: 54
End: 2019-10-03

## 2019-10-22 ENCOUNTER — ANCILLARY PROCEDURE (OUTPATIENT)
Dept: GENERAL RADIOLOGY | Facility: CLINIC | Age: 54
End: 2019-10-22
Attending: PHYSICIAN ASSISTANT
Payer: COMMERCIAL

## 2019-10-22 ENCOUNTER — OFFICE VISIT (OUTPATIENT)
Dept: FAMILY MEDICINE | Facility: CLINIC | Age: 54
End: 2019-10-22
Payer: COMMERCIAL

## 2019-10-22 VITALS
SYSTOLIC BLOOD PRESSURE: 126 MMHG | WEIGHT: 304 LBS | DIASTOLIC BLOOD PRESSURE: 72 MMHG | TEMPERATURE: 96.8 F | OXYGEN SATURATION: 95 % | HEART RATE: 62 BPM | BODY MASS INDEX: 37.8 KG/M2 | RESPIRATION RATE: 14 BRPM | HEIGHT: 75 IN

## 2019-10-22 DIAGNOSIS — M25.471 RIGHT ANKLE SWELLING: Primary | ICD-10-CM

## 2019-10-22 DIAGNOSIS — M25.471 RIGHT ANKLE SWELLING: ICD-10-CM

## 2019-10-22 LAB
BASOPHILS # BLD AUTO: 0 10E9/L (ref 0–0.2)
BASOPHILS NFR BLD AUTO: 0.4 %
CRP SERPL-MCNC: 6.7 MG/L (ref 0–8)
DIFFERENTIAL METHOD BLD: NORMAL
EOSINOPHIL # BLD AUTO: 0.2 10E9/L (ref 0–0.7)
EOSINOPHIL NFR BLD AUTO: 2.4 %
ERYTHROCYTE [DISTWIDTH] IN BLOOD BY AUTOMATED COUNT: 13.4 % (ref 10–15)
ERYTHROCYTE [SEDIMENTATION RATE] IN BLOOD BY WESTERGREN METHOD: 10 MM/H (ref 0–20)
HCT VFR BLD AUTO: 45 % (ref 40–53)
HGB BLD-MCNC: 14.9 G/DL (ref 13.3–17.7)
LYMPHOCYTES # BLD AUTO: 2.1 10E9/L (ref 0.8–5.3)
LYMPHOCYTES NFR BLD AUTO: 26 %
MCH RBC QN AUTO: 29 PG (ref 26.5–33)
MCHC RBC AUTO-ENTMCNC: 33.1 G/DL (ref 31.5–36.5)
MCV RBC AUTO: 88 FL (ref 78–100)
MONOCYTES # BLD AUTO: 0.7 10E9/L (ref 0–1.3)
MONOCYTES NFR BLD AUTO: 8.6 %
NEUTROPHILS # BLD AUTO: 5.1 10E9/L (ref 1.6–8.3)
NEUTROPHILS NFR BLD AUTO: 62.6 %
PLATELET # BLD AUTO: 293 10E9/L (ref 150–450)
RBC # BLD AUTO: 5.14 10E12/L (ref 4.4–5.9)
WBC # BLD AUTO: 8.1 10E9/L (ref 4–11)

## 2019-10-22 PROCEDURE — 73610 X-RAY EXAM OF ANKLE: CPT | Mod: RT

## 2019-10-22 PROCEDURE — 36415 COLL VENOUS BLD VENIPUNCTURE: CPT | Performed by: PHYSICIAN ASSISTANT

## 2019-10-22 PROCEDURE — 85652 RBC SED RATE AUTOMATED: CPT | Performed by: PHYSICIAN ASSISTANT

## 2019-10-22 PROCEDURE — 80048 BASIC METABOLIC PNL TOTAL CA: CPT | Performed by: PHYSICIAN ASSISTANT

## 2019-10-22 PROCEDURE — 85025 COMPLETE CBC W/AUTO DIFF WBC: CPT | Performed by: PHYSICIAN ASSISTANT

## 2019-10-22 PROCEDURE — 86140 C-REACTIVE PROTEIN: CPT | Performed by: PHYSICIAN ASSISTANT

## 2019-10-22 PROCEDURE — 99214 OFFICE O/P EST MOD 30 MIN: CPT | Performed by: PHYSICIAN ASSISTANT

## 2019-10-22 PROCEDURE — 84550 ASSAY OF BLOOD/URIC ACID: CPT | Performed by: PHYSICIAN ASSISTANT

## 2019-10-22 ASSESSMENT — MIFFLIN-ST. JEOR: SCORE: 2309.56

## 2019-10-22 NOTE — PROGRESS NOTES
Subjective     Kunal Rao is a 54 year old male who presents to clinic today for the following health issues:    HPI   Musculoskeletal problem/pain      Duration: 3-4 weeks    Description  Location: right foot/ankle    Intensity:  moderate    Accompanying signs and symptoms: swelling and pain    History  Previous similar problem: no   Previous evaluation:  none    Precipitating or alleviating factors:  Trauma or overuse: no   Aggravating factors include:     Therapies tried and outcome: none      Pat presents to the clinic today for evaluation of right ankle discomfort and swelling that began gradually 3 weeks ago.  He does not recall a specific injury or inciting event.  Pain is mild and described more as a discomfort due to the swelling. He has noticed the swelling along the posterior aspect of his ankle.  He has not experienced any calf pain, or edema, redness, fevers or systemic symptoms.  He has no hx of gout or recent changes in his diet.   He has not tried anything for the discomfort, he feels like it is not limiting his mobility or lifestyle but it is bothersome.                 Patient Active Problem List   Diagnosis     CARDIOVASCULAR SCREENING; LDL GOAL LESS THAN 160     Single kidney     Elevated serum creatinine     Donor of kidney for transplant     ADD (attention deficit disorder)     Major depression, recurrent (H)     Past Surgical History:   Procedure Laterality Date     COLONOSCOPY  6/2011    sigmoid diverticulosis - repeat 10 years     GENITOURINARY SURGERY      Vasectomy     LAPAROSCOPIC DONOR HAND ASSISTED KIDNEY LIVING RELATED  7/7/2011    Procedure:LAPAROSCOPIC DONOR HAND ASSISTED KIDNEY LIVING RELATED; RIGHT KIDNEY, ON-Q PUMP PLACEMENT; Surgeon:RUSSELL CHARLES; Location:[facility]     ORTHOPEDIC SURGERY  2000    closed reduction L thumb     SURGICAL HISTORY OF -       reduction of thumb fracture       Social History     Tobacco Use     Smoking status: Never Smoker     Smokeless tobacco:  "Never Used   Substance Use Topics     Alcohol use: Yes     Alcohol/week: 8.0 - 10.0 standard drinks     Types: 8 - 10 Standard drinks or equivalent per week     Comment: moderate     Family History   Problem Relation Age of Onset     Diabetes Father 65     Obesity Father      Diabetes Sister 40     Prostate Cancer Paternal Uncle 70        alive     Cancer Paternal Grandmother 80        Pancreatic     Cancer Paternal Uncle 60        Pancreatic     Cerebrovascular Disease Mother 82     Obesity Mother      Cerebrovascular Disease Maternal Grandfather 82             Other Cancer Maternal Grandmother      C.A.D. No family hx of      Cancer - colorectal No family hx of      Breast Cancer No family hx of          Current Outpatient Medications   Medication Sig Dispense Refill     amphetamine-dextroamphetamine (ADDERALL) 5 MG tablet Take 5 mg by mouth daily Pt may take 10MG. Pt is unsure       Ascorbic Acid (VITAMIN C PO) Take 500 mg by mouth daily       cholecalciferol (VITAMIN D) 1000 UNIT tablet Take 1 tablet (1,000 Units) by mouth daily 100 tablet 3     escitalopram (LEXAPRO) 20 MG tablet Take 30 mg by mouth daily       ferrous sulfate 325 (65 FE) MG tablet Take 1 tablet (325 mg) by mouth daily (with breakfast) 30 tablet 2     omega 3 1000 MG CAPS Take 1 g by mouth daily 90 capsule      vitamin B complex with vitamin C (VITAMIN  B COMPLEX) TABS tablet Take 1 tablet by mouth daily       No Known Allergies      Reviewed and updated as needed this visit by Provider  Med Hx  Surg Hx         Review of Systems   ROS COMP: Constitutional, HEENT, cardiovascular, pulmonary, GI, , musculoskeletal, neuro, skin, endocrine and psych systems are negative, except as otherwise noted.      Objective    /72   Pulse 62   Temp 96.8  F (36  C) (Tympanic)   Resp 14   Ht 1.905 m (6' 3\")   Wt 137.9 kg (304 lb)   SpO2 95%   BMI 38.00 kg/m    Body mass index is 38 kg/m .  Physical Exam   GENERAL: healthy, alert and no " "distress  MS: right ankle with effusion noted, mild TTP along posterior ankle without TTP along achilles tendon, no erythema or warmth noted. FROM of right ankle without pain, 55 strength of right ankle.  Calf is without TTP or swelling.   NEURO: Normal strength and tone, mentation intact and speech normal  PSYCH: mentation appears normal, affect normal/bright    Diagnostic Test Results:  Labs reviewed in Epic        Assessment & Plan     1. Right ankle swelling  Effusion of ankle joint with minimal pain, no limitation of ROM, no redness or warmth. Broad differential considered today.  His ankle x ray is normal with the exception of a decreased joint space of the ankle joint.  It is likely that his symptoms represent an arthritic effusion.  He is unable to take NSAIDs.  At this time, I have advised that he compress his ankle, use ice and elevate his ankle and may take tylenol for discomfort.  We are going to check blood work to further assess for another etiology.  If labs normal, we may consider a course of steroid vs. monitoring.  At this time, he is not having calf pain or swelling to suggest a DVT, and has no findings concerning for a septic joint.  He will RTC if worsening symptoms.   - XR Ankle Right G/E 3 Views; Future  - CBC with platelets differential  - Uric acid  - Basic metabolic panel  - ESR: Erythrocyte sedimentation rate  - CRP, inflammation     BMI:   Estimated body mass index is 38 kg/m  as calculated from the following:    Height as of this encounter: 1.905 m (6' 3\").    Weight as of this encounter: 137.9 kg (304 lb).         Follow up as above    No follow-ups on file.    Tex Goodwin PA-C  [facility]      "

## 2019-10-23 LAB
ANION GAP SERPL CALCULATED.3IONS-SCNC: 7 MMOL/L (ref 3–14)
BUN SERPL-MCNC: 17 MG/DL (ref 7–30)
CALCIUM SERPL-MCNC: 8.9 MG/DL (ref 8.5–10.1)
CHLORIDE SERPL-SCNC: 105 MMOL/L (ref 94–109)
CO2 SERPL-SCNC: 25 MMOL/L (ref 20–32)
CREAT SERPL-MCNC: 1.46 MG/DL (ref 0.66–1.25)
GFR SERPL CREATININE-BSD FRML MDRD: 54 ML/MIN/{1.73_M2}
GLUCOSE SERPL-MCNC: 93 MG/DL (ref 70–99)
POTASSIUM SERPL-SCNC: 4.3 MMOL/L (ref 3.4–5.3)
SODIUM SERPL-SCNC: 137 MMOL/L (ref 133–144)
URATE SERPL-MCNC: 7.2 MG/DL (ref 3.5–7.2)

## 2019-10-24 ENCOUNTER — TELEPHONE (OUTPATIENT)
Dept: FAMILY MEDICINE | Facility: CLINIC | Age: 54
End: 2019-10-24

## 2019-10-24 NOTE — TELEPHONE ENCOUNTER
Please call pat with the results of his labs.  His labs are all normal with the exception of his creatinine which is slightly elevated.  This appears baseline for him.  His labs did not show evidence of inflammation or gout.  Please check in to see if his swelling has improved.

## 2019-10-24 NOTE — TELEPHONE ENCOUNTER
Called patient and informed him of results in PA-C note below. Patient verbalized understanding and agrees with plan.       Patient stated that he is still having swelling in R. Foot and pain (mild). The swelling is mild, and is using the wraps which seem to help. Patient stated the swelling is slightly better than when he saw PA-C on the 22nd. Patient states he will monitor his swelling over the weekend and follow up on Monday.     Routing to PCP as FYI. Thank you.    Lizzy Spain RN, BSN  [facility]

## 2020-01-15 ENCOUNTER — TELEPHONE (OUTPATIENT)
Dept: FAMILY MEDICINE | Facility: CLINIC | Age: 55
End: 2020-01-15

## 2020-01-15 NOTE — TELEPHONE ENCOUNTER
Pt is due now to update PHQ9.  emilyhart message sent to pt. Follow up end date 2/3/20.   PHQ-9 SCORE 2/22/2017 12/5/2018 6/25/2019   PHQ-9 Total Score MyChart - 17 (Moderately severe depression) 12 (Moderate depression)   PHQ-9 Total Score 11 17 12     Sergio OBREGON CMA

## 2020-01-15 NOTE — LETTER
January 29, 2020      Kunal Rao  [address] [address]        Dear Kunal,    I care about your health and have reviewed your health plan. I have reviewed your medical conditions, medication list, and lab results and am making recommendations based on this review, to better manage your health.    You are in particular need of attention regarding:  -Depression    I am recommending that you:  -Please complete the enclosed PHQ9 and return it in the envelope provided    Here is a list of Health Maintenance topics that are due now or due soon:  Health Maintenance Due   Topic Date Due     Depression Assessment  11/09/2019     Preventive Care Visit  12/07/2019     Zoster (Shingles) Vaccine (2 of 2) 02/01/2019       Please call us at [phone] (or use [email or web address]) to address the above recommendations.     Thank you for trusting [facility] and we appreciate the opportunity to serve you.  We look forward to supporting your healthcare needs in the future.    Healthy Regards,    Tex Goodwin, MPH, PA-C

## 2020-01-28 NOTE — TELEPHONE ENCOUNTER
2nd attempt: Left a non-detailed message and call back number [phone].     Lizzy Spain RN, BSN  [facility]

## 2020-01-29 NOTE — TELEPHONE ENCOUNTER
3rd attempt.  Non detailed message left for pt to return call to clinic and ask to speak with a triage nurse.    Routing to TC to mail PHQ 9 to home address.    Viridiana BELTRAN RN  EP Triage

## 2020-02-08 ENCOUNTER — HEALTH MAINTENANCE LETTER (OUTPATIENT)
Age: 55
End: 2020-02-08

## 2020-03-31 ENCOUNTER — VIRTUAL VISIT (OUTPATIENT)
Dept: FAMILY MEDICINE | Facility: CLINIC | Age: 55
End: 2020-03-31
Payer: COMMERCIAL

## 2020-03-31 DIAGNOSIS — F33.9 RECURRENT MAJOR DEPRESSIVE DISORDER, REMISSION STATUS UNSPECIFIED (H): ICD-10-CM

## 2020-03-31 PROCEDURE — 99207 ZZC NO CHARGE LOS: CPT | Mod: TEL | Performed by: PHYSICIAN ASSISTANT

## 2020-03-31 RX ORDER — ESCITALOPRAM OXALATE 10 MG/1
10 TABLET ORAL DAILY
Qty: 90 TABLET | Refills: 1 | COMMUNITY
Start: 2020-03-31 | End: 2023-07-13

## 2020-03-31 ASSESSMENT — ANXIETY QUESTIONNAIRES
2. NOT BEING ABLE TO STOP OR CONTROL WORRYING: SEVERAL DAYS
1. FEELING NERVOUS, ANXIOUS, OR ON EDGE: SEVERAL DAYS
3. WORRYING TOO MUCH ABOUT DIFFERENT THINGS: SEVERAL DAYS
6. BECOMING EASILY ANNOYED OR IRRITABLE: NOT AT ALL
IF YOU CHECKED OFF ANY PROBLEMS ON THIS QUESTIONNAIRE, HOW DIFFICULT HAVE THESE PROBLEMS MADE IT FOR YOU TO DO YOUR WORK, TAKE CARE OF THINGS AT HOME, OR GET ALONG WITH OTHER PEOPLE: SOMEWHAT DIFFICULT
5. BEING SO RESTLESS THAT IT IS HARD TO SIT STILL: NOT AT ALL
7. FEELING AFRAID AS IF SOMETHING AWFUL MIGHT HAPPEN: NOT AT ALL
GAD7 TOTAL SCORE: 4

## 2020-03-31 ASSESSMENT — PATIENT HEALTH QUESTIONNAIRE - PHQ9
5. POOR APPETITE OR OVEREATING: SEVERAL DAYS
SUM OF ALL RESPONSES TO PHQ QUESTIONS 1-9: 10

## 2020-03-31 NOTE — TELEPHONE ENCOUNTER
.  PHQ 12/5/2018 6/25/2019 3/31/2020   PHQ-9 Total Score 17 12 10   Q9: Thoughts of better off dead/self-harm past 2 weeks Several days Several days Several days   F/U: Thoughts of suicide or self-harm No No -   F/U: Safety concerns No No -       Please see updated PHQ9 screening. FYI to provider. Please advise if you would like telephone or E-visit for follow up on symptoms.     Lizzy Spain RN, BSN  [facility]

## 2020-03-31 NOTE — TELEPHONE ENCOUNTER
Routing to team to inform and assist with scheduling. Thank you very much.     Lizzy Spain RN, BSN  [facility] [place]

## 2020-03-31 NOTE — PROGRESS NOTES
"        Subjective     Kunal Rao is a 54 year old male who is being evaluated via a billable telephone visit.      The patient has been notified of following:     \"This telephone visit will be conducted via a call between you and your physician/provider. We have found that certain health care needs can be provided without the need for a physical exam.  This service lets us provide the care you need with a short phone conversation.  If a prescription is necessary we can send it directly to your pharmacy.  If lab work is needed we can place an order for that and you can then stop by our lab to have the test done at a later time.    If during the course of the call the physician/provider feels a telephone visit is not appropriate, you will not be charged for this service.\"     Patient has given verbal consent for Telephone visit?  Yes    Kunal Rao complains of   Chief Complaint   Patient presents with     Depression     Fallow up depression screening, pateint fill out depression screening form and mail it.        ALLERGIES  Patient has no known allergies.    Depression Followup    How are you doing with your depression since your last visit? Improved currently on 10 mg Lexapro, following with psychiatry    Are you having other symptoms that might be associated with depression? No    Have you had a significant life event?  No     Are you feeling anxious or having panic attacks?   No    Do you have any concerns with your use of alcohol or other drugs? No    Social History     Tobacco Use     Smoking status: Never Smoker     Smokeless tobacco: Never Used   Substance Use Topics     Alcohol use: Yes     Alcohol/week: 8.0 - 10.0 standard drinks     Types: 8 - 10 Standard drinks or equivalent per week     Comment: moderate     Drug use: No     PHQ 12/5/2018 6/25/2019 3/31/2020   PHQ-9 Total Score 17 12 10   Q9: Thoughts of better off dead/self-harm past 2 weeks Several days Several days Several days   F/U: " Thoughts of suicide or self-harm No No -   F/U: Safety concerns No No -     MARY-7 SCORE 2/22/2017 3/31/2020   Total Score 6 4     Last PHQ-9 3/31/2020   1.  Little interest or pleasure in doing things 1   2.  Feeling down, depressed, or hopeless 1   3.  Trouble falling or staying asleep, or sleeping too much 2   4.  Feeling tired or having little energy 1   5.  Poor appetite or overeating 2   6.  Feeling bad about yourself 1   7.  Trouble concentrating 1   8.  Moving slowly or restless 0   Q9: Thoughts of better off dead/self-harm past 2 weeks 1   PHQ-9 Total Score 10   In the past two weeks have you had thoughts of suicide or self harm? -   Do you have concerns about your personal safety or the safety of others? -     MARY-7  3/31/2020   1. Feeling nervous, anxious, or on edge 1   2. Not being able to stop or control worrying 1   3. Worrying too much about different things 1   4. Trouble relaxing 1   5. Being so restless that it is hard to sit still 0   6. Becoming easily annoyed or irritable 0   7. Feeling afraid, as if something awful might happen 0   MARY-7 Total Score 4   If you checked any problems, how difficult have they made it for you to do your work, take care of things at home, or get along with other people? Somewhat difficult       Suicide Assessment Five-step Evaluation and Treatment (SAFE-T)      How many servings of fruits and vegetables do you eat daily?  0-1    On average, how many sweetened beverages do you drink each day (Examples: soda, juice, sweet tea, etc.  Do NOT count diet or artificially sweetened beverages)?   0    How many days per week do you exercise enough to make your heart beat faster? 3 or less    How many minutes a day do you exercise enough to make your heart beat faster? 9 or less    How many days per week do you miss taking your medication? 0            Pat filled out a PHQ 9/GAD7 at the clinics request.  He is currently following with Dr. Mcgowan of psychiatry in [place].   His symptoms are stable and he has been able to reduce his dose to 10 mg as he was having sexual SE at the higher dose    Patient Active Problem List   Diagnosis     CARDIOVASCULAR SCREENING; LDL GOAL LESS THAN 160     Single kidney     Elevated serum creatinine     Donor of kidney for transplant     ADD (attention deficit disorder)     Major depression, recurrent (H)     Past Surgical History:   Procedure Laterality Date     COLONOSCOPY  2011    sigmoid diverticulosis - repeat 10 years     GENITOURINARY SURGERY      Vasectomy     LAPAROSCOPIC DONOR HAND ASSISTED KIDNEY LIVING RELATED  2011    Procedure:LAPAROSCOPIC DONOR HAND ASSISTED KIDNEY LIVING RELATED; RIGHT KIDNEY, ON-Q PUMP PLACEMENT; Surgeon:RUSSELL CHARLES; Location:[facility]     ORTHOPEDIC SURGERY      closed reduction L thumb     SURGICAL HISTORY OF -       reduction of thumb fracture       Social History     Tobacco Use     Smoking status: Never Smoker     Smokeless tobacco: Never Used   Substance Use Topics     Alcohol use: Yes     Alcohol/week: 8.0 - 10.0 standard drinks     Types: 8 - 10 Standard drinks or equivalent per week     Comment: moderate     Family History   Problem Relation Age of Onset     Diabetes Father 65     Obesity Father      Diabetes Sister 40     Prostate Cancer Paternal Uncle 70        alive     Cancer Paternal Grandmother 80        Pancreatic     Cancer Paternal Uncle 60        Pancreatic     Cerebrovascular Disease Mother 82     Obesity Mother      Cerebrovascular Disease Maternal Grandfather 82             Other Cancer Maternal Grandmother      C.A.D. No family hx of      Cancer - colorectal No family hx of      Breast Cancer No family hx of          Current Outpatient Medications   Medication Sig Dispense Refill     amphetamine-dextroamphetamine (ADDERALL) 5 MG tablet Take 5 mg by mouth daily Pt may take 10MG. Pt is unsure       Ascorbic Acid (VITAMIN C PO) Take 500 mg by mouth daily       cholecalciferol  (VITAMIN D) 1000 UNIT tablet Take 1 tablet (1,000 Units) by mouth daily 100 tablet 3     escitalopram (LEXAPRO) 10 MG tablet Take 1 tablet (10 mg) by mouth daily 90 tablet 1     ferrous sulfate 325 (65 FE) MG tablet Take 1 tablet (325 mg) by mouth daily (with breakfast) 30 tablet 2     omega 3 1000 MG CAPS Take 1 g by mouth daily 90 capsule      vitamin B complex with vitamin C (VITAMIN  B COMPLEX) TABS tablet Take 1 tablet by mouth daily       No Known Allergies    Reviewed and updated as needed this visit by Provider         Review of Systems   ROS COMP: Constitutional, HEENT, cardiovascular, pulmonary, gi and gu systems are negative, except as otherwise noted.       Objective   Reported vitals:  There were no vitals taken for this visit.   healthy, alert and no distress  Psych:His affect is appropriate     Diagnostic Test Results:  Labs reviewed in Epic        1. Recurrent major depressive disorder, remission status unspecified (H)  Bianca has been following with psychiatry and is doing well.  His meds were updated in epic.   I advised Bianca that this visit would not be charged today as this visit was initiated by our clinic for an overdue PHQ but he is managed by another physician. No evaluation, advice or further discussion was completed at todays visit.       No follow-ups on file.      Phone call duration:  5 minutes       Tex Goodwin PA-C

## 2020-04-01 ASSESSMENT — ANXIETY QUESTIONNAIRES: GAD7 TOTAL SCORE: 4

## 2020-11-07 ENCOUNTER — HEALTH MAINTENANCE LETTER (OUTPATIENT)
Age: 55
End: 2020-11-07

## 2021-03-16 ENCOUNTER — IMMUNIZATION (OUTPATIENT)
Dept: NURSING | Facility: CLINIC | Age: 56
End: 2021-03-16
Payer: COMMERCIAL

## 2021-03-16 PROCEDURE — 0001A PR COVID VAC PFIZER DIL RECON 30 MCG/0.3 ML IM: CPT

## 2021-03-16 PROCEDURE — 91300 PR COVID VAC PFIZER DIL RECON 30 MCG/0.3 ML IM: CPT

## 2021-03-27 ENCOUNTER — HEALTH MAINTENANCE LETTER (OUTPATIENT)
Age: 56
End: 2021-03-27

## 2021-04-06 ENCOUNTER — IMMUNIZATION (OUTPATIENT)
Dept: NURSING | Facility: CLINIC | Age: 56
End: 2021-04-06
Payer: COMMERCIAL

## 2021-04-06 PROCEDURE — 0002A PR COVID VAC PFIZER DIL RECON 30 MCG/0.3 ML IM: CPT

## 2021-04-06 PROCEDURE — 91300 PR COVID VAC PFIZER DIL RECON 30 MCG/0.3 ML IM: CPT

## 2021-09-05 ENCOUNTER — HEALTH MAINTENANCE LETTER (OUTPATIENT)
Age: 56
End: 2021-09-05

## 2021-10-11 ENCOUNTER — OFFICE VISIT (OUTPATIENT)
Dept: FAMILY MEDICINE | Facility: CLINIC | Age: 56
End: 2021-10-11
Payer: COMMERCIAL

## 2021-10-11 VITALS
DIASTOLIC BLOOD PRESSURE: 80 MMHG | SYSTOLIC BLOOD PRESSURE: 124 MMHG | OXYGEN SATURATION: 95 % | BODY MASS INDEX: 36.42 KG/M2 | TEMPERATURE: 97.8 F | HEART RATE: 67 BPM | WEIGHT: 291.4 LBS

## 2021-10-11 DIAGNOSIS — Z23 HIGH PRIORITY FOR 2019-NCOV VACCINE: ICD-10-CM

## 2021-10-11 DIAGNOSIS — Z01.818 PREOP GENERAL PHYSICAL EXAM: Primary | ICD-10-CM

## 2021-10-11 DIAGNOSIS — B35.1 ONYCHOMYCOSIS: ICD-10-CM

## 2021-10-11 DIAGNOSIS — N43.3 HYDROCELE, UNSPECIFIED HYDROCELE TYPE: ICD-10-CM

## 2021-10-11 LAB — HGB BLD-MCNC: 14.3 G/DL (ref 13.3–17.7)

## 2021-10-11 PROCEDURE — 99214 OFFICE O/P EST MOD 30 MIN: CPT | Mod: 25 | Performed by: PHYSICIAN ASSISTANT

## 2021-10-11 PROCEDURE — U0005 INFEC AGEN DETEC AMPLI PROBE: HCPCS | Performed by: PHYSICIAN ASSISTANT

## 2021-10-11 PROCEDURE — 36415 COLL VENOUS BLD VENIPUNCTURE: CPT | Performed by: PHYSICIAN ASSISTANT

## 2021-10-11 PROCEDURE — 91300 COVID-19,PF,PFIZER (12+ YRS): CPT | Performed by: PHYSICIAN ASSISTANT

## 2021-10-11 PROCEDURE — 0004A COVID-19,PF,PFIZER (12+ YRS): CPT | Performed by: PHYSICIAN ASSISTANT

## 2021-10-11 PROCEDURE — U0003 INFECTIOUS AGENT DETECTION BY NUCLEIC ACID (DNA OR RNA); SEVERE ACUTE RESPIRATORY SYNDROME CORONAVIRUS 2 (SARS-COV-2) (CORONAVIRUS DISEASE [COVID-19]), AMPLIFIED PROBE TECHNIQUE, MAKING USE OF HIGH THROUGHPUT TECHNOLOGIES AS DESCRIBED BY CMS-2020-01-R: HCPCS | Performed by: PHYSICIAN ASSISTANT

## 2021-10-11 PROCEDURE — 80053 COMPREHEN METABOLIC PANEL: CPT | Performed by: PHYSICIAN ASSISTANT

## 2021-10-11 PROCEDURE — 85018 HEMOGLOBIN: CPT | Performed by: PHYSICIAN ASSISTANT

## 2021-10-11 RX ORDER — TERBINAFINE HYDROCHLORIDE 250 MG/1
250 TABLET ORAL DAILY
Qty: 90 TABLET | Refills: 0 | Status: SHIPPED | OUTPATIENT
Start: 2021-10-11 | End: 2022-01-19

## 2021-10-11 ASSESSMENT — ANXIETY QUESTIONNAIRES

## 2021-10-11 ASSESSMENT — PATIENT HEALTH QUESTIONNAIRE - PHQ9
5. POOR APPETITE OR OVEREATING: NOT AT ALL
SUM OF ALL RESPONSES TO PHQ QUESTIONS 1-9: 4

## 2021-10-11 NOTE — PROGRESS NOTES
[address] [address]  Phone: [phone]  Primary Provider: [facility]  Pre-op Performing Provider: ENEDELIA HEWITT      PREOPERATIVE EVALUATION:  Today's date: 10/11/2021    Kunal Rao is a 56 year old male who presents for a preoperative evaluation.    Surgical Information:  Surgery/Procedure: hydroseal  Surgery Location: [facility]  Surgeon: Dr. Knight  Surgery Date: 10-13-21  Time of Surgery: 11am  Where patient plans to recover: At home with family  Fax number for surgical facility: [phone]    Type of Anesthesia Anticipated: General    Assessment & Plan     The proposed surgical procedure is considered LOW risk.    Preop general physical exam  - Hemoglobin; Future  - Comprehensive metabolic panel (BMP + Alb, Alk Phos, ALT, AST, Total. Bili, TP); Future  - Asymptomatic COVID-19 Virus (Coronavirus) by PCR Nose  - Hemoglobin  - Comprehensive metabolic panel (BMP + Alb, Alk Phos, ALT, AST, Total. Bili, TP)    Hydrocele, unspecified hydrocele type    High priority for 2019-nCoV vaccine  - COVID-19,PF,PFIZER    Onychomycosis  Left toenail with ongoing onychomycosis.  Uses and SE of treatment discussed and understood by patient.  He will return in 8-12 weeks for repeat labs.   - terbinafine (LAMISIL) 250 MG tablet; Take 1 tablet (250 mg) by mouth daily         Risks and Recommendations:  The patient has the following additional risks and recommendations for perioperative complications:   - No identified additional risk factors other than previously addressed    Medication Instructions:   - SSRIs, SNRIs, TCAs, Antipsychotics: Continue without modification.     RECOMMENDATION:  APPROVAL GIVEN to proceed with proposed procedure, without further diagnostic evaluation.      Subjective     HPI related to upcoming procedure: Pat presents to the clinic for preoperative examination prior to a hydrocele repair.   He is feeling well today, no acute concerns.     Preop Questions 10/11/2021   1. Have you ever had a heart attack or stroke? No   2. Have you ever had surgery on your heart or blood vessels, such as a stent placement, a coronary artery bypass, or surgery on an artery in your head, neck, heart, or legs? No   3. Do you have chest pain with activity? No   4. Do you have a history of  heart failure? No   5. Do you currently have a cold, bronchitis or symptoms of other infection? No   6. Do you have a cough, shortness of breath, or wheezing? No   7. Do you or anyone in your family have previous history of blood clots? No   8. Do you or does anyone in your family have a serious bleeding problem such as prolonged bleeding following surgeries or cuts? No   9. Have you ever had problems with anemia or been told to take iron pills? YES -   10. Have you had any abnormal blood loss such as black, tarry or bloody stools? No   11. Have you ever had a blood transfusion? No   12. Are you willing to have a blood transfusion if it is medically needed before, during, or after your surgery? Yes   13. Have you or any of your relatives ever had problems with anesthesia? No   14. Do you have sleep apnea, excessive snoring or daytime drowsiness? No   15. Do you have any artifical heart valves or other implanted medical devices like a pacemaker, defibrillator, or continuous glucose monitor? No   16. Do you have artificial joints? No   17. Are you allergic to latex? No       Health Care Directive:  Patient does not have a Health Care Directive or Living Will: NO    Preoperative Review of :   reviewed - no record of controlled substances prescribed.      Status of Chronic Conditions:  See problem list for active medical problems.  Problems all longstanding and stable, except as noted/documented.  See ROS for pertinent symptoms related to these conditions.      Review of Systems  CONSTITUTIONAL: NEGATIVE for fever, chills, change in  weight  INTEGUMENTARY/SKIN: NEGATIVE for worrisome rashes, moles or lesions  EYES: NEGATIVE for vision changes or irritation  ENT/MOUTH: NEGATIVE for ear, mouth and throat problems  RESP: NEGATIVE for significant cough or SOB  CV: NEGATIVE for chest pain, palpitations or peripheral edema  GI: NEGATIVE for nausea, abdominal pain, heartburn, or change in bowel habits  : NEGATIVE for frequency, dysuria, or hematuria  MUSCULOSKELETAL: NEGATIVE for significant arthralgias or myalgia  NEURO: NEGATIVE for weakness, dizziness or paresthesias  ENDOCRINE: NEGATIVE for temperature intolerance, skin/hair changes  HEME: NEGATIVE for bleeding problems  PSYCHIATRIC: NEGATIVE for changes in mood or affect    Patient Active Problem List    Diagnosis Date Noted     Donor of kidney for transplant 12/04/2016     Priority: Medium     ADD (attention deficit disorder)      Priority: Medium     Major depression, recurrent (H)      Priority: Medium     Elevated serum creatinine 02/12/2014     Priority: Medium     Single kidney 08/10/2011     Priority: Medium     CARDIOVASCULAR SCREENING; LDL GOAL LESS THAN 160 10/31/2010     Priority: Medium      Past Medical History:   Diagnosis Date     ADD (attention deficit disorder)      Kidney donor 2011    pt donated kidney for transplantation     Major depression, recurrent (H) 1980's     THUMB FRACTURE     fracture left thumb, closed reduction     Past Surgical History:   Procedure Laterality Date     COLONOSCOPY  6/2011    sigmoid diverticulosis - repeat 10 years     GENITOURINARY SURGERY      Vasectomy     LAPAROSCOPIC DONOR HAND ASSISTED KIDNEY LIVING RELATED  7/7/2011    Procedure:LAPAROSCOPIC DONOR HAND ASSISTED KIDNEY LIVING RELATED; RIGHT KIDNEY, ON-Q PUMP PLACEMENT; Surgeon:RUSSELL CHARLES; Location:[facility]     ORTHOPEDIC SURGERY  2000    closed reduction L thumb     SURGICAL HISTORY OF -       reduction of thumb fracture     Current Outpatient Medications   Medication Sig Dispense Refill      amphetamine-dextroamphetamine (ADDERALL) 5 MG tablet Take 5 mg by mouth daily Pt may take 10MG. Pt is unsure       Ascorbic Acid (VITAMIN C PO) Take 500 mg by mouth daily       cholecalciferol (VITAMIN D) 1000 UNIT tablet Take 1 tablet (1,000 Units) by mouth daily 100 tablet 3     escitalopram (LEXAPRO) 10 MG tablet Take 1 tablet (10 mg) by mouth daily 90 tablet 1     ferrous sulfate 325 (65 FE) MG tablet Take 1 tablet (325 mg) by mouth daily (with breakfast) 30 tablet 2     omega 3 1000 MG CAPS Take 1 g by mouth daily 90 capsule      terbinafine (LAMISIL) 250 MG tablet Take 1 tablet (250 mg) by mouth daily 90 tablet 0     vitamin B complex with vitamin C (VITAMIN  B COMPLEX) TABS tablet Take 1 tablet by mouth daily         No Known Allergies     Social History     Tobacco Use     Smoking status: Never Smoker     Smokeless tobacco: Never Used   Substance Use Topics     Alcohol use: Yes     Alcohol/week: 8.0 - 10.0 standard drinks     Types: 8 - 10 Standard drinks or equivalent per week     Comment: moderate     Family History   Problem Relation Age of Onset     Diabetes Father 65     Obesity Father      Diabetes Sister 40     Prostate Cancer Paternal Uncle 70        alive     Cancer Paternal Grandmother 80        Pancreatic     Cancer Paternal Uncle 60        Pancreatic     Cerebrovascular Disease Mother 82     Obesity Mother      Cerebrovascular Disease Maternal Grandfather 82             Other Cancer Maternal Grandmother      C.A.D. No family hx of      Cancer - colorectal No family hx of      Breast Cancer No family hx of      History   Drug Use No         Objective     /80 (Cuff Size: Adult Large)   Pulse 67   Temp 97.8  F (36.6  C) (Tympanic)   Wt 132.2 kg (291 lb 6.4 oz)   SpO2 95%   BMI 36.42 kg/m      Physical Exam    GENERAL APPEARANCE: healthy, alert and no distress     EYES: EOMI,  PERRL     HENT: ear canals and TM's normal and nose and mouth without ulcers or lesions     NECK: no  adenopathy, no asymmetry, masses, or scars and thyroid normal to palpation     RESP: lungs clear to auscultation - no rales, rhonchi or wheezes     CV: regular rates and rhythm, normal S1 S2, no S3 or S4 and no murmur, click or rub     ABDOMEN:  soft, nontender, no HSM or masses and bowel sounds normal     MS: extremities normal- no gross deformities noted, no evidence of inflammation in joints, FROM in all extremities.     SKIN: no suspicious lesions or rashes     NEURO: Normal strength and tone, sensory exam grossly normal, mentation intact and speech normal     PSYCH: mentation appears normal. and affect normal/bright    Recent Labs   Lab Test 10/22/19  1046   HGB 14.9         POTASSIUM 4.3   CR 1.46*      Results for orders placed or performed in visit on 10/11/21   Asymptomatic COVID-19 Virus (Coronavirus) by PCR Nose     Status: Normal    Specimen: Nose; Swab   Result Value Ref Range    SARS CoV2 PCR Negative Negative    Narrative    Testing was performed using the Aptima SARS-CoV-2 Assay on the  [employer] Instrument System. Additional information about this  Emergency Use Authorization (EUA) assay can be found via the Lab  Guide. This test should be ordered for the detection of SARS-CoV-2 in  individuals who meet SARS-CoV-2 clinical and/or epidemiological  criteria. Test performance is unknown in asymptomatic patients. This  test is for in vitro diagnostic use under the FDA EUA for  laboratories certified under CLIA to perform high complexity testing.  This test has not been FDA cleared or approved. A negative result  does not rule out the presence of PCR inhibitors in the specimen or  target RNA in concentration below the limit of detection for the  assay. The possibility of a false negative should be considered if  the patient's recent exposure or clinical presentation suggests  COVID-19. This test was validated by the [facility]. This laboratory  is certified under  the Clinical Laboratory Improvement Amendments of 1988 (CLIA-88) as  qualified to perform high complexity laboratory testing.   Hemoglobin     Status: Normal   Result Value Ref Range    Hemoglobin 14.3 13.3 - 17.7 g/dL   Comprehensive metabolic panel (BMP + Alb, Alk Phos, ALT, AST, Total. Bili, TP)     Status: Abnormal   Result Value Ref Range    Sodium 138 133 - 144 mmol/L    Potassium 3.8 3.4 - 5.3 mmol/L    Chloride 106 94 - 109 mmol/L    Carbon Dioxide (CO2) 25 20 - 32 mmol/L    Anion Gap 7 3 - 14 mmol/L    Urea Nitrogen 21 7 - 30 mg/dL    Creatinine 1.27 (H) 0.66 - 1.25 mg/dL    Calcium 9.1 8.5 - 10.1 mg/dL    Glucose 127 (H) 70 - 99 mg/dL    Alkaline Phosphatase 75 40 - 150 U/L    AST 25 0 - 45 U/L    ALT 37 0 - 70 U/L    Protein Total 7.4 6.8 - 8.8 g/dL    Albumin 3.9 3.4 - 5.0 g/dL    Bilirubin Total 0.3 0.2 - 1.3 mg/dL    GFR Estimate 63 >60 mL/min/1.73m2       Diagnostics:  Labs pending at this time.  Results will be reviewed when available.   No EKG required, no history of coronary heart disease, significant arrhythmia, peripheral arterial disease or other structural heart disease.    Revised Cardiac Risk Index (RCRI):  The patient has the following serious cardiovascular risks for perioperative complications:   - No serious cardiac risks = 0 points     RCRI Interpretation: 1 point: Class II (low risk - 0.9% complication rate)           Signed Electronically by: Tex Goodwin PA-C  Copy of this evaluation report is provided to requesting physician.

## 2021-10-11 NOTE — PATIENT INSTRUCTIONS

## 2021-10-12 LAB
ALBUMIN SERPL-MCNC: 3.9 G/DL (ref 3.4–5)
ALP SERPL-CCNC: 75 U/L (ref 40–150)
ALT SERPL W P-5'-P-CCNC: 37 U/L (ref 0–70)
ANION GAP SERPL CALCULATED.3IONS-SCNC: 7 MMOL/L (ref 3–14)
AST SERPL W P-5'-P-CCNC: 25 U/L (ref 0–45)
BILIRUB SERPL-MCNC: 0.3 MG/DL (ref 0.2–1.3)
BUN SERPL-MCNC: 21 MG/DL (ref 7–30)
CALCIUM SERPL-MCNC: 9.1 MG/DL (ref 8.5–10.1)
CHLORIDE BLD-SCNC: 106 MMOL/L (ref 94–109)
CO2 SERPL-SCNC: 25 MMOL/L (ref 20–32)
CREAT SERPL-MCNC: 1.27 MG/DL (ref 0.66–1.25)
GFR SERPL CREATININE-BSD FRML MDRD: 63 ML/MIN/1.73M2
GLUCOSE BLD-MCNC: 127 MG/DL (ref 70–99)
POTASSIUM BLD-SCNC: 3.8 MMOL/L (ref 3.4–5.3)
PROT SERPL-MCNC: 7.4 G/DL (ref 6.8–8.8)
SARS-COV-2 RNA RESP QL NAA+PROBE: NEGATIVE
SODIUM SERPL-SCNC: 138 MMOL/L (ref 133–144)

## 2021-10-12 ASSESSMENT — ANXIETY QUESTIONNAIRES: GAD7 TOTAL SCORE: 0

## 2021-10-12 NOTE — RESULT ENCOUNTER NOTE
Kunal-  Here are your recent results. They are stable from your pervious labs.  Your COVID 19 test is negative.     Good luck with your procedure!

## 2022-01-05 ENCOUNTER — TRANSFERRED RECORDS (OUTPATIENT)
Dept: HEALTH INFORMATION MANAGEMENT | Facility: CLINIC | Age: 57
End: 2022-01-05
Payer: COMMERCIAL

## 2022-01-19 ENCOUNTER — VIRTUAL VISIT (OUTPATIENT)
Dept: FAMILY MEDICINE | Facility: CLINIC | Age: 57
End: 2022-01-19
Payer: COMMERCIAL

## 2022-01-19 DIAGNOSIS — B35.1 ONYCHOMYCOSIS: ICD-10-CM

## 2022-01-19 PROCEDURE — 99213 OFFICE O/P EST LOW 20 MIN: CPT | Mod: 95 | Performed by: FAMILY MEDICINE

## 2022-01-19 RX ORDER — TERBINAFINE HYDROCHLORIDE 250 MG/1
250 TABLET ORAL DAILY
Qty: 90 TABLET | Refills: 0 | Status: SHIPPED | OUTPATIENT
Start: 2022-01-19 | End: 2023-07-13

## 2022-01-19 ASSESSMENT — PATIENT HEALTH QUESTIONNAIRE - PHQ9
SUM OF ALL RESPONSES TO PHQ QUESTIONS 1-9: 2
SUM OF ALL RESPONSES TO PHQ QUESTIONS 1-9: 2
10. IF YOU CHECKED OFF ANY PROBLEMS, HOW DIFFICULT HAVE THESE PROBLEMS MADE IT FOR YOU TO DO YOUR WORK, TAKE CARE OF THINGS AT HOME, OR GET ALONG WITH OTHER PEOPLE: NOT DIFFICULT AT ALL

## 2022-01-19 ASSESSMENT — ANXIETY QUESTIONNAIRES
GAD7 TOTAL SCORE: 1
7. FEELING AFRAID AS IF SOMETHING AWFUL MIGHT HAPPEN: NOT AT ALL
6. BECOMING EASILY ANNOYED OR IRRITABLE: NOT AT ALL
5. BEING SO RESTLESS THAT IT IS HARD TO SIT STILL: NOT AT ALL
1. FEELING NERVOUS, ANXIOUS, OR ON EDGE: SEVERAL DAYS
2. NOT BEING ABLE TO STOP OR CONTROL WORRYING: NOT AT ALL
3. WORRYING TOO MUCH ABOUT DIFFERENT THINGS: NOT AT ALL
GAD7 TOTAL SCORE: 1
GAD7 TOTAL SCORE: 1
4. TROUBLE RELAXING: NOT AT ALL
7. FEELING AFRAID AS IF SOMETHING AWFUL MIGHT HAPPEN: NOT AT ALL

## 2022-01-19 NOTE — PROGRESS NOTES
Bianca is a 56 year old who is being evaluated via a billable video visit.      How would you like to obtain your AVS? MyChart  If the video visit is dropped, the invitation should be resent by:   Will anyone else be joining your video visit? No    Video Start Time: 8:36 am    Assessment & Plan     1. Onychomycosis: Extend course given improvement. Check liver enzymes. Call with results when available.  - Comprehensive metabolic panel (BMP + Alb, Alk Phos, ALT, AST, Total. Bili, TP); Future  - terbinafine (LAMISIL) 250 MG tablet; Take 1 tablet (250 mg) by mouth daily  Dispense: 90 tablet; Refill: 0    Return in about 6 weeks (around 3/2/2022), or if symptoms worsen or fail to improve.    Imtiaz Morocho MD  [facility]    This chart is completed utilizing dictation software; typos and/or incorrect word substitutions may unintentionally occur.    Subjective   Bianca is a 56 year old who presents for the following health issues:    History of Present Illness       Mental Health Follow-up:  Patient presents to follow-up on Depression.                 Social History  Tobacco Use    Smoking status: Never Smoker    Smokeless tobacco: Never Used  Alcohol use: Yes    Alcohol/week: 8.0 - 10.0 standard drinks    Types: 8 - 10 Standard drinks or equivalent per week    Comment: moderate  Drug use: No      Today's PHQ-9         PHQ-9 Total Score:     (P) 2   PHQ-9 Q9 Thoughts of better off dead/self-harm past 2 weeks :   (P) Not at all   Thoughts of suicide or self harm:      Self-harm Plan:        Self-harm Action:          Safety concerns for self or others:           He eats 2-3 servings of fruits and vegetables daily.He consumes 0 sweetened beverage(s) daily.He exercises with enough effort to increase his heart rate 30 to 60 minutes per day.  He exercises with enough effort to increase his heart rate 4 days per week.   He is taking medications regularly.     Patient has seen improvement, but  not resolution of onychomycosis with terbinafine. Would like longer duration. No side effects.     Review of Systems   Constitutional, HEENT, cardiovascular, pulmonary, gi and gu systems are negative, except as otherwise noted.      Objective           Vitals:  No vitals were obtained today due to virtual visit.    Physical Exam   GENERAL: Healthy, alert and no distress  EYES: Eyes grossly normal to inspection.  No discharge or erythema, or obvious scleral/conjunctival abnormalities.  RESP: No audible wheeze, cough, or visible cyanosis.  No visible retractions or increased work of breathing.    SKIN: Visible skin clear. No significant rash, abnormal pigmentation or lesions.  NEURO: Cranial nerves grossly intact.  Mentation and speech appropriate for age.  PSYCH: Mentation appears normal, affect normal/bright, judgement and insight intact, normal speech and appearance well-groomed.    Labs: none        Video-Visit Details    Type of service:  Video Visit    Video End Time:8:41 AM    Originating Location (pt. Location): Home    Distant Location (provider location):  [facility]     Platform used for Video Visit: [employer]  Answers for HPI/ROS submitted by the patient on 1/19/2022  If you checked off any problems, how difficult have these problems made it for you to do your work, take care of things at home, or get along with other people?: Not difficult at all  PHQ9 TOTAL SCORE: 2  MARY 7 TOTAL SCORE: 1

## 2022-01-19 NOTE — PATIENT INSTRUCTIONS
Patient Education     Nail Fungal Infection  A nail fungal infection changes the way fingernails and toenails look. They may thicken, discolor, change shape, or split. This condition is hard to treat because nails grow slowly and have limited blood supply. The infection often comes back after treatment.   There are 2 types of medicines used to treat this condition:     Antifungal medicines for the skin (topical).  These are applied to the skin and nail area. These medicines don't work well because they can t get deep into the nail.    Oral antifungal medicines. These medicines work better because they go into the nail from the inside out. But the infection may still come back. It may take 9 to 12 months for your nail to look normal again. This means you are cured. You can repeat treatment if needed. Most people take these medicines without any problems. It's rare to stop therapy because of side effects. But your healthcare provider may give you some monitoring tests. Talk about possible side effects with your provider before starting treatment.  If medicines fail, the nail can be removed surgically or chemically. These methods physically remove the fungus from the body. This helps medical treatment be more effective.   If the changes in your nails are not bothering you, you may not need treatment. Discuss this with your healthcare provider.   Home care    Use medicines exactly as directed for as long as directed. Treating a fungal infection can take longer than other kinds of infections.    Smoking is a risk factor for fungal infection. This is one more reason to quit.    Wear absorbent socks, and shoes that let your feet breathe. Sweaty feet increase your risk of fungal infection. They also make an existing infection harder to treat.    Use footwear when in damp public places like swimming pools, gyms, and shower rooms. This will help you stay away from the fungus that grows there.    Don't share nail clippers or  scissors with others.    Follow-up care  Follow up with your healthcare provider, or as advised.  When to seek medical advice  Call your healthcare provider right away if any of these occur:    Skin by the nail becomes red, swollen, painful, or drains pus (a creamy yellow or white liquid)    Side effects from oral anti-fungal medicines  Rick last reviewed this educational content on 7/1/2019 2000-2021 [employer]. All rights reserved. This information is not intended as a substitute for professional medical care. Always follow your healthcare professional's instructions.

## 2022-01-20 ASSESSMENT — ANXIETY QUESTIONNAIRES: GAD7 TOTAL SCORE: 1

## 2022-02-17 ENCOUNTER — LAB (OUTPATIENT)
Dept: LAB | Facility: CLINIC | Age: 57
End: 2022-02-17
Payer: COMMERCIAL

## 2022-02-17 DIAGNOSIS — B35.1 ONYCHOMYCOSIS: ICD-10-CM

## 2022-02-17 PROCEDURE — 36415 COLL VENOUS BLD VENIPUNCTURE: CPT

## 2022-02-17 PROCEDURE — 80053 COMPREHEN METABOLIC PANEL: CPT

## 2022-02-18 LAB
ALBUMIN SERPL-MCNC: 3.7 G/DL (ref 3.4–5)
ALP SERPL-CCNC: 81 U/L (ref 40–150)
ALT SERPL W P-5'-P-CCNC: 29 U/L (ref 0–70)
ANION GAP SERPL CALCULATED.3IONS-SCNC: 7 MMOL/L (ref 3–14)
AST SERPL W P-5'-P-CCNC: 19 U/L (ref 0–45)
BILIRUB SERPL-MCNC: 0.3 MG/DL (ref 0.2–1.3)
BUN SERPL-MCNC: 22 MG/DL (ref 7–30)
CALCIUM SERPL-MCNC: 8.9 MG/DL (ref 8.5–10.1)
CHLORIDE BLD-SCNC: 106 MMOL/L (ref 94–109)
CO2 SERPL-SCNC: 26 MMOL/L (ref 20–32)
CREAT SERPL-MCNC: 1.19 MG/DL (ref 0.66–1.25)
GFR SERPL CREATININE-BSD FRML MDRD: 72 ML/MIN/1.73M2
GLUCOSE BLD-MCNC: 82 MG/DL (ref 70–99)
POTASSIUM BLD-SCNC: 4.1 MMOL/L (ref 3.4–5.3)
PROT SERPL-MCNC: 7 G/DL (ref 6.8–8.8)
SODIUM SERPL-SCNC: 139 MMOL/L (ref 133–144)

## 2022-02-18 NOTE — RESULT ENCOUNTER NOTE
Pat,  Your provider is currently on the office, I am covering for him.  Your recent studies look good.  Please let me know if you have any questions or concerns and follow up as discussed in clinic.    Sincerely.  Dr. ALISE Myers MD, FAAFP  Family Medicine Physician  [facility]  [address] [place] [address]

## 2022-03-14 ENCOUNTER — OFFICE VISIT (OUTPATIENT)
Dept: FAMILY MEDICINE | Facility: CLINIC | Age: 57
End: 2022-03-14
Payer: COMMERCIAL

## 2022-03-14 VITALS
HEART RATE: 60 BPM | HEIGHT: 76 IN | OXYGEN SATURATION: 98 % | WEIGHT: 271.8 LBS | BODY MASS INDEX: 33.1 KG/M2 | TEMPERATURE: 96.7 F | RESPIRATION RATE: 14 BRPM

## 2022-03-14 DIAGNOSIS — Z90.5 SINGLE KIDNEY: ICD-10-CM

## 2022-03-14 DIAGNOSIS — Z12.11 SPECIAL SCREENING FOR MALIGNANT NEOPLASMS, COLON: ICD-10-CM

## 2022-03-14 DIAGNOSIS — Z12.5 SCREENING FOR PROSTATE CANCER: ICD-10-CM

## 2022-03-14 DIAGNOSIS — Z23 NEED FOR VACCINATION: ICD-10-CM

## 2022-03-14 DIAGNOSIS — F98.8 ATTENTION DEFICIT DISORDER, UNSPECIFIED HYPERACTIVITY PRESENCE: ICD-10-CM

## 2022-03-14 DIAGNOSIS — Z00.00 ROUTINE HISTORY AND PHYSICAL EXAMINATION OF ADULT: Primary | ICD-10-CM

## 2022-03-14 DIAGNOSIS — F33.9 RECURRENT MAJOR DEPRESSIVE DISORDER, REMISSION STATUS UNSPECIFIED (H): ICD-10-CM

## 2022-03-14 PROCEDURE — 90471 IMMUNIZATION ADMIN: CPT | Performed by: PHYSICIAN ASSISTANT

## 2022-03-14 PROCEDURE — 90750 HZV VACC RECOMBINANT IM: CPT | Performed by: PHYSICIAN ASSISTANT

## 2022-03-14 PROCEDURE — 99396 PREV VISIT EST AGE 40-64: CPT | Mod: 25 | Performed by: PHYSICIAN ASSISTANT

## 2022-03-14 RX ORDER — CEPHALEXIN 500 MG/1
CAPSULE ORAL
COMMUNITY
Start: 2021-10-13 | End: 2022-03-14

## 2022-03-14 RX ORDER — METHYLPREDNISOLONE 4 MG
1000 TABLET, DOSE PACK ORAL
COMMUNITY
End: 2024-07-22

## 2022-03-14 RX ORDER — OXYCODONE HYDROCHLORIDE 5 MG/1
TABLET ORAL
COMMUNITY
Start: 2021-10-13 | End: 2022-03-14

## 2022-03-14 ASSESSMENT — ENCOUNTER SYMPTOMS
DIARRHEA: 0
HEMATOCHEZIA: 0
JOINT SWELLING: 0
HEARTBURN: 0
DYSURIA: 0
SHORTNESS OF BREATH: 0
HEMATURIA: 0
MYALGIAS: 0
ARTHRALGIAS: 0
EYE PAIN: 0
COUGH: 0
SORE THROAT: 0
DIZZINESS: 0
HEADACHES: 0
NERVOUS/ANXIOUS: 0
PALPITATIONS: 0
PARESTHESIAS: 0
FREQUENCY: 1
FEVER: 0
NAUSEA: 0
WEAKNESS: 0
CONSTIPATION: 0
ABDOMINAL PAIN: 0
CHILLS: 0

## 2022-03-14 ASSESSMENT — PAIN SCALES - GENERAL: PAINLEVEL: NO PAIN (0)

## 2022-03-14 NOTE — PROGRESS NOTES
SUBJECTIVE:   CC: Kunal Rao is an 56 year old male who presents for preventative health visit.       Patient has been advised of split billing requirements and indicates understanding: Yes  Healthy Habits:     Getting at least 3 servings of Calcium per day:  Yes    Bi-annual eye exam:  NO    Dental care twice a year:  Yes    Sleep apnea or symptoms of sleep apnea:  None    Diet:  Regular (no restrictions)    Frequency of exercise:  4-5 days/week    Duration of exercise:  30-45 minutes    Taking medications regularly:  Yes    Medication side effects:  Not applicable    PHQ-2 Total Score: 2    Additional concerns today:  Yes              Today's PHQ-2 Score:   PHQ-2 ( 1999 [employer]) 3/14/2022   Q1: Little interest or pleasure in doing things 1   Q2: Feeling down, depressed or hopeless 1   PHQ-2 Score 2   PHQ-2 Total Score (12-17 Years)- Positive if 3 or more points; Administer PHQ-A if positive -   Q1: Little interest or pleasure in doing things Several days   Q2: Feeling down, depressed or hopeless Several days   PHQ-2 Score 2       Abuse: Current or Past(Physical, Sexual or Emotional)- No  Do you feel safe in your environment? Yes    Have you ever done Advance Care Planning? (For example, a Health Directive, POLST, or a discussion with a medical provider or your loved ones about your wishes): Yes, patient states has an Advance Care Planning document and will bring a copy to the clinic.    Social History     Tobacco Use     Smoking status: Never Smoker     Smokeless tobacco: Never Used   Substance Use Topics     Alcohol use: Not Currently     Alcohol/week: 8.0 - 10.0 standard drinks     Comment: Not currently         Alcohol Use 3/14/2022   Prescreen: >3 drinks/day or >7 drinks/week? Not Applicable       Last PSA: No results found for: PSA    Reviewed orders with patient. Reviewed health maintenance and updated orders accordingly - Yes  Patient Active Problem List   Diagnosis     CARDIOVASCULAR SCREENING; LDL  GOAL LESS THAN 160     Single kidney     Elevated serum creatinine     Donor of kidney for transplant     ADD (attention deficit disorder)     Major depression, recurrent (H)     Past Surgical History:   Procedure Laterality Date     COLONOSCOPY  2011    sigmoid diverticulosis - repeat 10 years     EYE SURGERY  2007    Lasik     GENITOURINARY SURGERY      Vasectomy     LAPAROSCOPIC DONOR HAND ASSISTED KIDNEY LIVING RELATED  2011    Procedure:LAPAROSCOPIC DONOR HAND ASSISTED KIDNEY LIVING RELATED; RIGHT KIDNEY, ON-Q PUMP PLACEMENT; Surgeon:RUSSELL CHARLES; Location:[facility]     MOHS MICROGRAPHIC PROCEDURE       ORTHOPEDIC SURGERY      closed reduction L thumb     SURGICAL HISTORY OF -       reduction of thumb fracture       Social History     Tobacco Use     Smoking status: Never Smoker     Smokeless tobacco: Never Used   Substance Use Topics     Alcohol use: Not Currently     Alcohol/week: 8.0 - 10.0 standard drinks     Comment: Not currently     Family History   Problem Relation Age of Onset     Cerebrovascular Disease Mother 82        stroke     Obesity Mother      Breast Cancer Mother         2017     Diabetes Father 65     Obesity Father      Other Cancer Father         Mantle Cell lymphoma     Diabetes Sister 40     Diabetes Sister      Other Cancer Maternal Grandmother         Pancreatic     Cerebrovascular Disease Maternal Grandfather 82             Cancer Paternal Grandmother 80        Pancreatic     Prostate Cancer Paternal Uncle 70        alive     Cancer Paternal Uncle 60        Pancreatic     Other Cancer Other         Uncle - pancreatic     C.A.D. No family hx of      Cancer - colorectal No family hx of      Breast Cancer No family hx of          Current Outpatient Medications   Medication Sig Dispense Refill     amphetamine-dextroamphetamine (ADDERALL) 5 MG tablet Take 5 mg by mouth daily Pt may take 10MG. Pt is unsure       Ascorbic Acid (VITAMIN C PO) Take 500 mg by mouth daily        cholecalciferol (VITAMIN D) 1000 UNIT tablet Take 1 tablet (1,000 Units) by mouth daily 100 tablet 3     escitalopram (LEXAPRO) 10 MG tablet Take 1 tablet (10 mg) by mouth daily 90 tablet 1     ferrous sulfate 325 (65 FE) MG tablet Take 1 tablet (325 mg) by mouth daily (with breakfast) 30 tablet 2     Glucosamine Sulfate 500 MG TABS Take 1,000 mg by mouth Take 2 500 mg tabs daily       omega 3 1000 MG CAPS Take 1 g by mouth daily 90 capsule      terbinafine (LAMISIL) 250 MG tablet Take 1 tablet (250 mg) by mouth daily 90 tablet 0     vitamin B complex with vitamin C (VITAMIN  B COMPLEX) TABS tablet Take 1 tablet by mouth daily       No Known Allergies    Reviewed and updated as needed this visit by clinical staff   Tobacco  Allergies   Problems  Med Hx  Surg Hx  Fam Hx          Reviewed and updated as needed this visit by Provider   Tobacco    Problems  Med Hx  Surg Hx  Fam Hx         Past Medical History:   Diagnosis Date     ADD (attention deficit disorder)      Depressive disorder 1993    Under treatment     Kidney donor 2011    pt donated kidney for transplantation     Major depression, recurrent (H) 1980's     THUMB FRACTURE     fracture left thumb, closed reduction      Past Surgical History:   Procedure Laterality Date     COLONOSCOPY  06/2011    sigmoid diverticulosis - repeat 10 years     EYE SURGERY  2007    Lasik     GENITOURINARY SURGERY      Vasectomy     LAPAROSCOPIC DONOR HAND ASSISTED KIDNEY LIVING RELATED  07/07/2011    Procedure:LAPAROSCOPIC DONOR HAND ASSISTED KIDNEY LIVING RELATED; RIGHT KIDNEY, ON-Q PUMP PLACEMENT; Surgeon:RUSSELL CHARLES; Location:[facility]     MOHS MICROGRAPHIC PROCEDURE       ORTHOPEDIC SURGERY  2000    closed reduction L thumb     SURGICAL HISTORY OF -       reduction of thumb fracture     OB History   No obstetric history on file.       Review of Systems   Constitutional: Negative for chills and fever.   HENT: Negative for congestion, ear pain, hearing loss and  "sore throat.    Eyes: Negative for pain and visual disturbance.   Respiratory: Negative for cough and shortness of breath.    Cardiovascular: Negative for chest pain, palpitations and peripheral edema.   Gastrointestinal: Negative for abdominal pain, constipation, diarrhea, heartburn, hematochezia and nausea.   Genitourinary: Positive for frequency and urgency. Negative for dysuria, genital sores, hematuria, impotence and penile discharge.   Musculoskeletal: Negative for arthralgias, joint swelling and myalgias.   Skin: Negative for rash.   Neurological: Negative for dizziness, weakness, headaches and paresthesias.   Psychiatric/Behavioral: Negative for mood changes. The patient is not nervous/anxious.          OBJECTIVE:   Pulse 60   Temp (!) 96.7  F (35.9  C) (Tympanic)   Resp 14   Ht 1.92 m (6' 3.59\")   Wt 123.3 kg (271 lb 12.8 oz)   SpO2 98%   BMI 33.44 kg/m      Physical Exam  GENERAL: healthy, alert and no distress  EYES: Eyes grossly normal to inspection, PERRL and conjunctivae and sclerae normal  HENT: ear canals and TM's normal, nose and mouth without ulcers or lesions  NECK: no adenopathy, no asymmetry, masses, or scars and thyroid normal to palpation  RESP: lungs clear to auscultation - no rales, rhonchi or wheezes  CV: regular rate and rhythm, normal S1 S2, no S3 or S4, no murmur, click or rub, no peripheral edema and peripheral pulses strong  ABDOMEN: soft, nontender, no hepatosplenomegaly, no masses and bowel sounds normal  MS: no gross musculoskeletal defects noted, no edema  SKIN: no suspicious lesions or rashes  NEURO: Normal strength and tone, mentation intact and speech normal  PSYCH: mentation appears normal, affect normal/bright    Diagnostic Test Results:  Labs reviewed in Epic  none     ASSESSMENT/PLAN:   1. Routine history and physical examination of adult  Pat has been working on weight loss.  Currently doing the \" profile\" diet.  This is going well. Lost 40 lbs so far.  Plan to " "monitor blood sugar and lipids annually.  He had labs recently and so he will return at a later date for PSA and lipid screen      2. Single kidney  Kidney function has been normal.  Will continue to monitor annually, recent labs 3 weeks ago    3. Recurrent major depressive disorder, remission status unspecified (H)  Stable, following with psychiatry and therapist. No recent med changes    4. Attention deficit disorder, unspecified hyperactivity presence  Stable, per psychaitry    5. Special screening for malignant neoplasms, colon  - Adult Gastro Ref - Procedure Only; Future    6. Screening for prostate cancer  - PSA, screen; Future    7. Need for vaccination  - SHINGRIX [[id number]]        COUNSELING:   Reviewed preventive health counseling, as reflected in patient instructions       Regular exercise       Healthy diet/nutrition    Estimated body mass index is 33.44 kg/m  as calculated from the following:    Height as of this encounter: 1.92 m (6' 3.59\").    Weight as of this encounter: 123.3 kg (271 lb 12.8 oz).     Weight management plan: Discussed healthy diet and exercise guidelines    He reports that he has never smoked. He has never used smokeless tobacco.      Counseling Resources:  ATP IV Guidelines  Pooled Cohorts Equation Calculator  FRAX Risk Assessment  ICSI Preventive Guidelines  Dietary Guidelines for Americans, 2010  USDA's MyPlate  ASA Prophylaxis  Lung CA Screening    [facility]ANCO Mercado [facility]  "

## 2022-03-15 ENCOUNTER — TELEPHONE (OUTPATIENT)
Dept: GASTROENTEROLOGY | Facility: CLINIC | Age: 57
End: 2022-03-15
Payer: COMMERCIAL

## 2022-03-15 DIAGNOSIS — Z11.59 ENCOUNTER FOR SCREENING FOR OTHER VIRAL DISEASES: Primary | ICD-10-CM

## 2022-03-15 NOTE — TELEPHONE ENCOUNTER
Screening Questions  BlueKIND OF PREP RedLOCATION [review exclusion criteria] GreenSEDATION TYPE    1. Have you had a positive covid test in the last 90 days? N     2. Are you active on mychart? Y    3. What insurance is in the chart? MEDICA     3.  Ordering/Referring Provider: ENEDELIA HEWITT    4. BMI 32.5 [BMI OVER 40-EXTENDED PREP]  If greater than 40 review exclusion criteria [PAC APPT IF @ [facility]]    5.  Respiratory Screening :  [If yes to any of the following HOSPITAL setting only]     Do you use daily home oxygen? N    Do you have mod to severe Obstructive Sleep Apnea? N  [OKAY @ [facility] SH PH RI]   Do you have Pulmonary Hypertension? N     Do you have UNCONTROLLED asthma? N      6. Have you had a heart or lung transplant? N      7. Are you currently on dialysis? N [ If yes, G-PREP & HOSPITAL setting only]     8. Do you have chronic kidney disease? N [ If yes, G-PREP ]    9. Have you had a stroke or Transient ischemic attack (TIA) within 6 months? N (If yes, please review exclusion criteria)    10. In the past 6 months, have you had any heart related issues including cardiomyopathy or heart attack? N        If yes, did it require cardiac stenting or other implantable device?       11. Do you have any implantable devices in your body (pacemaker, defib, LVAD)? N (If yes, please review exclusion criteria)    12. Do you take nitroglycerin? N   If yes, how often?   (if yes, HOSPITAL setting ONLY)    13. Are you currently taking any blood thinners? N   [IF YES, INFORM PATIENT TO FOLLOW UP W/ ORDERING PROVIDER FOR BRIDGING INSTRUCTIONS]     14. Do you have a diagnosis of diabetes? N   [ If yes, G-PREP ]    15. [FEMALES] Are you currently pregnant?     If yes, how many weeks?     16. Are you taking any prescription pain medications on a routine schedule?  N  [ If yes, EXTENDED PREP.] [If yes, MAC]    17. Do you have any chemical dependencies such as alcohol, street drugs, or methadone?  N [If yes, MAC]    18. Do  you have any history of post-traumatic stress syndrome, severe anxiety or history of psychosis?  N  [If yes, MAC]    19. Do you transfer independently?  Y    20.  Do you have any issues with constipation?  N  [ If yes, EXTENDED PREP.]    21. Preferred LOCAL Pharmacy for Pre Prescription       [id number]  [place], [place] - [address]  Scheduling Details      Caller : Kunal Rao    (Please ask for phone number if not scheduled by patient)    Type of Procedure Scheduled: COLON  Which Colonoscopy Prep was Sent?: MPREP  RENITA CF PATIENTS & GROEN'S PATIENTS NEEDS EXTENDED PREP  Surgeon: LAURIE  Date of Procedure: 4/22/22  Location:       Sedation Type: CS  Conscious Sedation- Needs  for 6 hours after the procedure  MAC/General-Needs  for 24 hours after procedure    Pre-op Required at [facility] [place], [place] and OR for MAC sedation:   (advise patient they will need a pre-op prior to procedure -)      Informed patient they will need an adult  Y  Cannot take any type of public or medical transportation alone    Pre-Procedure Covid test to be completed at [facility] or Externally: Y    Confirmed Nurse will call to complete assessment Y    Additional comments:

## 2022-04-18 ENCOUNTER — LAB (OUTPATIENT)
Dept: URGENT CARE | Facility: URGENT CARE | Age: 57
End: 2022-04-18
Attending: FAMILY MEDICINE
Payer: COMMERCIAL

## 2022-04-18 DIAGNOSIS — Z20.822 CLOSE EXPOSURE TO 2019 NOVEL CORONAVIRUS: ICD-10-CM

## 2022-04-18 LAB — SARS-COV-2 RNA RESP QL NAA+PROBE: NEGATIVE

## 2022-04-18 PROCEDURE — U0005 INFEC AGEN DETEC AMPLI PROBE: HCPCS

## 2022-04-18 PROCEDURE — U0003 INFECTIOUS AGENT DETECTION BY NUCLEIC ACID (DNA OR RNA); SEVERE ACUTE RESPIRATORY SYNDROME CORONAVIRUS 2 (SARS-COV-2) (CORONAVIRUS DISEASE [COVID-19]), AMPLIFIED PROBE TECHNIQUE, MAKING USE OF HIGH THROUGHPUT TECHNOLOGIES AS DESCRIBED BY CMS-2020-01-R: HCPCS

## 2022-04-22 ENCOUNTER — HOSPITAL ENCOUNTER (OUTPATIENT)
Facility: AMBULATORY SURGERY CENTER | Age: 57
Discharge: HOME | End: 2022-04-22
Attending: SURGERY | Admitting: SURGERY
Payer: COMMERCIAL

## 2022-04-22 VITALS
HEART RATE: 53 BPM | DIASTOLIC BLOOD PRESSURE: 85 MMHG | SYSTOLIC BLOOD PRESSURE: 142 MMHG | OXYGEN SATURATION: 95 % | TEMPERATURE: 97.7 F | RESPIRATION RATE: 16 BRPM

## 2022-04-22 LAB — COLONOSCOPY: NORMAL

## 2022-04-22 PROCEDURE — G8907 PT DOC NO EVENTS ON DISCHARG: HCPCS

## 2022-04-22 PROCEDURE — G8918 PT W/O PREOP ORDER IV AB PRO: HCPCS

## 2022-04-22 PROCEDURE — 45385 COLONOSCOPY W/LESION REMOVAL: CPT

## 2022-04-22 PROCEDURE — 99152 MOD SED SAME PHYS/QHP 5/>YRS: CPT | Mod: 59 | Performed by: SURGERY

## 2022-04-22 PROCEDURE — 88305 TISSUE EXAM BY PATHOLOGIST: CPT | Performed by: PATHOLOGY

## 2022-04-22 PROCEDURE — 99153 MOD SED SAME PHYS/QHP EA: CPT | Mod: 59 | Performed by: SURGERY

## 2022-04-22 PROCEDURE — 45385 COLONOSCOPY W/LESION REMOVAL: CPT | Mod: PT | Performed by: SURGERY

## 2022-04-22 RX ORDER — ONDANSETRON 2 MG/ML
4 INJECTION INTRAMUSCULAR; INTRAVENOUS
Status: DISCONTINUED | OUTPATIENT
Start: 2022-04-22 | End: 2022-04-23 | Stop reason: HOSPADM

## 2022-04-22 RX ORDER — LIDOCAINE 40 MG/G
CREAM TOPICAL
Status: DISCONTINUED | OUTPATIENT
Start: 2022-04-22 | End: 2022-04-23 | Stop reason: HOSPADM

## 2022-04-22 RX ORDER — FENTANYL CITRATE 50 UG/ML
INJECTION, SOLUTION INTRAMUSCULAR; INTRAVENOUS PRN
Status: DISCONTINUED | OUTPATIENT
Start: 2022-04-22 | End: 2022-04-22 | Stop reason: HOSPADM

## 2022-04-26 LAB
PATH REPORT.COMMENTS IMP SPEC: NORMAL
PATH REPORT.COMMENTS IMP SPEC: NORMAL
PATH REPORT.FINAL DX SPEC: NORMAL
PATH REPORT.GROSS SPEC: NORMAL
PATH REPORT.MICROSCOPIC SPEC OTHER STN: NORMAL
PATH REPORT.RELEVANT HX SPEC: NORMAL
PHOTO IMAGE: NORMAL

## 2022-05-17 ENCOUNTER — IMMUNIZATION (OUTPATIENT)
Dept: NURSING | Facility: CLINIC | Age: 57
End: 2022-05-17
Payer: COMMERCIAL

## 2022-05-17 PROCEDURE — 0054A COVID-19,PF,PFIZER (12+ YRS): CPT

## 2022-05-17 PROCEDURE — 91305 COVID-19,PF,PFIZER (12+ YRS): CPT

## 2022-06-20 ENCOUNTER — TELEPHONE (OUTPATIENT)
Dept: FAMILY MEDICINE | Facility: CLINIC | Age: 57
End: 2022-06-20
Payer: COMMERCIAL

## 2022-10-11 ENCOUNTER — IMMUNIZATION (OUTPATIENT)
Dept: NURSING | Facility: CLINIC | Age: 57
End: 2022-10-11
Payer: COMMERCIAL

## 2022-10-11 PROCEDURE — 91312 COVID-19,PF,PFIZER BOOSTER BIVALENT: CPT

## 2022-10-11 PROCEDURE — 0124A COVID-19,PF,PFIZER BOOSTER BIVALENT: CPT

## 2022-10-23 ENCOUNTER — HEALTH MAINTENANCE LETTER (OUTPATIENT)
Age: 57
End: 2022-10-23

## 2023-06-01 ENCOUNTER — HEALTH MAINTENANCE LETTER (OUTPATIENT)
Age: 58
End: 2023-06-01

## 2023-07-13 ENCOUNTER — OFFICE VISIT (OUTPATIENT)
Dept: FAMILY MEDICINE | Facility: CLINIC | Age: 58
End: 2023-07-13
Payer: COMMERCIAL

## 2023-07-13 VITALS
HEART RATE: 55 BPM | OXYGEN SATURATION: 99 % | RESPIRATION RATE: 17 BRPM | BODY MASS INDEX: 34.57 KG/M2 | DIASTOLIC BLOOD PRESSURE: 82 MMHG | SYSTOLIC BLOOD PRESSURE: 132 MMHG | TEMPERATURE: 97.8 F | WEIGHT: 278 LBS | HEIGHT: 75 IN

## 2023-07-13 DIAGNOSIS — Z00.00 ENCOUNTER FOR ROUTINE ADULT HEALTH EXAMINATION WITHOUT ABNORMAL FINDINGS: ICD-10-CM

## 2023-07-13 DIAGNOSIS — F33.9 RECURRENT MAJOR DEPRESSIVE DISORDER, REMISSION STATUS UNSPECIFIED (H): ICD-10-CM

## 2023-07-13 DIAGNOSIS — R79.89 ELEVATED SERUM CREATININE: ICD-10-CM

## 2023-07-13 DIAGNOSIS — F52.32 ACQUIRED GENERALIZED DELAYED EJACULATION: ICD-10-CM

## 2023-07-13 DIAGNOSIS — Z52.4 DONOR OF KIDNEY FOR TRANSPLANT: ICD-10-CM

## 2023-07-13 DIAGNOSIS — Z90.5 SINGLE KIDNEY: ICD-10-CM

## 2023-07-13 DIAGNOSIS — Z12.11 SCREEN FOR COLON CANCER: Primary | ICD-10-CM

## 2023-07-13 DIAGNOSIS — F98.8 ATTENTION DEFICIT DISORDER, UNSPECIFIED HYPERACTIVITY PRESENCE: ICD-10-CM

## 2023-07-13 DIAGNOSIS — Z13.6 CARDIOVASCULAR SCREENING; LDL GOAL LESS THAN 160: ICD-10-CM

## 2023-07-13 DIAGNOSIS — Z12.5 SCREENING FOR PROSTATE CANCER: ICD-10-CM

## 2023-07-13 PROCEDURE — 84443 ASSAY THYROID STIM HORMONE: CPT | Performed by: PHYSICIAN ASSISTANT

## 2023-07-13 PROCEDURE — 99213 OFFICE O/P EST LOW 20 MIN: CPT | Mod: 25 | Performed by: PHYSICIAN ASSISTANT

## 2023-07-13 PROCEDURE — G0103 PSA SCREENING: HCPCS | Performed by: PHYSICIAN ASSISTANT

## 2023-07-13 PROCEDURE — 96127 BRIEF EMOTIONAL/BEHAV ASSMT: CPT | Performed by: PHYSICIAN ASSISTANT

## 2023-07-13 PROCEDURE — 99396 PREV VISIT EST AGE 40-64: CPT | Performed by: PHYSICIAN ASSISTANT

## 2023-07-13 PROCEDURE — 36415 COLL VENOUS BLD VENIPUNCTURE: CPT | Performed by: PHYSICIAN ASSISTANT

## 2023-07-13 PROCEDURE — 80061 LIPID PANEL: CPT | Performed by: PHYSICIAN ASSISTANT

## 2023-07-13 PROCEDURE — 80053 COMPREHEN METABOLIC PANEL: CPT | Performed by: PHYSICIAN ASSISTANT

## 2023-07-13 ASSESSMENT — ENCOUNTER SYMPTOMS
EYE PAIN: 0
DIZZINESS: 0
ARTHRALGIAS: 0
DIARRHEA: 0
HEMATOCHEZIA: 0
CHILLS: 0
COUGH: 0
SORE THROAT: 0
ABDOMINAL PAIN: 0
FEVER: 0
CONSTIPATION: 0
HEADACHES: 0
SHORTNESS OF BREATH: 0
DYSURIA: 0
JOINT SWELLING: 0
WEAKNESS: 0
NAUSEA: 0
MYALGIAS: 0
PALPITATIONS: 0
HEMATURIA: 0
FREQUENCY: 0
HEARTBURN: 0
NERVOUS/ANXIOUS: 0
PARESTHESIAS: 0

## 2023-07-13 ASSESSMENT — PATIENT HEALTH QUESTIONNAIRE - PHQ9
SUM OF ALL RESPONSES TO PHQ QUESTIONS 1-9: 5
10. IF YOU CHECKED OFF ANY PROBLEMS, HOW DIFFICULT HAVE THESE PROBLEMS MADE IT FOR YOU TO DO YOUR WORK, TAKE CARE OF THINGS AT HOME, OR GET ALONG WITH OTHER PEOPLE: NOT DIFFICULT AT ALL
SUM OF ALL RESPONSES TO PHQ QUESTIONS 1-9: 5

## 2023-07-13 ASSESSMENT — PAIN SCALES - GENERAL: PAINLEVEL: NO PAIN (0)

## 2023-07-13 NOTE — PROGRESS NOTES
SUBJECTIVE:   CC: Bianca is an 57 year old who presents for preventative health visit.        No data to display              Healthy Habits:     Getting at least 3 servings of Calcium per day:  Yes    Bi-annual eye exam:  Yes    Dental care twice a year:  Yes    Sleep apnea or symptoms of sleep apnea:  None    Diet:  Regular (no restrictions)    Frequency of exercise:  4-5 days/week    Duration of exercise:  30-45 minutes    Taking medications regularly:  Yes    Medication side effects:  None    Additional concerns today:  No      Today's PHQ-9 Score:       7/13/2023    11:24 AM   PHQ-9 SCORE   PHQ-9 Total Score MyChart 5 (Mild depression)   PHQ-9 Total Score 5               Social History     Tobacco Use     Smoking status: Never     Smokeless tobacco: Never   Substance Use Topics     Alcohol use: Not Currently     Alcohol/week: 8.0 - 10.0 standard drinks of alcohol     Comment: Not currently             7/13/2023    11:26 AM   Alcohol Use   Prescreen: >3 drinks/day or >7 drinks/week? No       Last PSA: No results found for: PSA    Reviewed orders with patient. Reviewed health maintenance and updated orders accordingly - Yes  Patient Active Problem List   Diagnosis     CARDIOVASCULAR SCREENING; LDL GOAL LESS THAN 160     Single kidney     Elevated serum creatinine     Donor of kidney for transplant     ADD (attention deficit disorder)     Major depression, recurrent (H)     Past Surgical History:   Procedure Laterality Date     COLONOSCOPY  06/2011    sigmoid diverticulosis - repeat 10 years     COLONOSCOPY N/A 4/22/2022    Procedure: COLONOSCOPY, FLEXIBLE, WITH LESION REMOVAL USING SNARE;  Surgeon: Sean Lovelace DO;  Location:  OR     COLONOSCOPY WITH CO2 INSUFFLATION N/A 4/22/2022    Procedure: COLONOSCOPY, WITH CO2 INSUFFLATION;  Surgeon: Sean Lovelace DO;  Location:  OR     EYE SURGERY  2007    Lasik     GENITOURINARY SURGERY      Vasectomy     LAPAROSCOPIC DONOR HAND ASSISTED KIDNEY LIVING RELATED   2011    Procedure:LAPAROSCOPIC DONOR HAND ASSISTED KIDNEY LIVING RELATED; RIGHT KIDNEY, ON-Q PUMP PLACEMENT; Surgeon:RUSSELL CHARLES; Location:[facility] OR     MOHS MICROGRAPHIC PROCEDURE       ORTHOPEDIC SURGERY      closed reduction L thumb     SURGICAL HISTORY OF -       reduction of thumb fracture       Social History     Tobacco Use     Smoking status: Never     Smokeless tobacco: Never   Substance Use Topics     Alcohol use: Not Currently     Alcohol/week: 8.0 - 10.0 standard drinks of alcohol     Comment: Not currently     Family History   Problem Relation Age of Onset     Cerebrovascular Disease Mother         Major stroke and TIAs     Obesity Mother      Breast Cancer Mother         2017     Diabetes Father      Obesity Father      Other Cancer Father         Mantle Cell lymphoma, acute myeloid leukemia     Diabetes Sister 40     Diabetes Sister      Other Cancer Maternal Grandmother         Pancreatic     Cerebrovascular Disease Maternal Grandfather              Cancer Paternal Grandmother 80        Pancreatic     Prostate Cancer Paternal Uncle 70        alive     Cancer Paternal Uncle 60        Pancreatic     Other Cancer Other         Uncle - pancreatic     C.A.D. No family hx of      Cancer - colorectal No family hx of      Breast Cancer No family hx of          Current Outpatient Medications   Medication Sig Dispense Refill     cholecalciferol (VITAMIN D) 1000 UNIT tablet Take 1 tablet (1,000 Units) by mouth daily 100 tablet 3     ferrous sulfate 325 (65 FE) MG tablet Take 1 tablet (325 mg) by mouth daily (with breakfast) 30 tablet 2     Glucosamine Sulfate 500 MG TABS Take 1,000 mg by mouth Take 2 500 mg tabs daily       omega 3 1000 MG CAPS Take 1 g by mouth daily 90 capsule      vitamin B complex with vitamin C (STRESS TAB) tablet Take 1 tablet by mouth daily       amphetamine-dextroamphetamine (ADDERALL) 5 MG tablet Take 5 mg by mouth daily Pt may take 10MG. Pt is unsure (Patient not  "taking: Reported on 7/13/2023)       Ascorbic Acid (VITAMIN C PO) Take 500 mg by mouth daily       terbinafine (LAMISIL) 250 MG tablet Take 1 tablet (250 mg) by mouth daily 90 tablet 0     No Known Allergies    Reviewed and updated as needed this visit by clinical staff   Tobacco  Allergies  Meds  Problems  Med Hx  Surg Hx  Fam Hx          Reviewed and updated as needed this visit by Provider   Tobacco    Problems  Med Hx  Surg Hx  Fam Hx             Review of Systems   Constitutional: Negative for chills and fever.   HENT: Negative for congestion, ear pain, hearing loss and sore throat.    Eyes: Negative for pain and visual disturbance.   Respiratory: Negative for cough and shortness of breath.    Cardiovascular: Negative for chest pain, palpitations and peripheral edema.   Gastrointestinal: Negative for abdominal pain, constipation, diarrhea, heartburn, hematochezia and nausea.   Genitourinary: Negative for dysuria, frequency, genital sores, hematuria, impotence, penile discharge and urgency.   Musculoskeletal: Negative for arthralgias, joint swelling and myalgias.   Skin: Negative for rash.   Neurological: Negative for dizziness, weakness, headaches and paresthesias.   Psychiatric/Behavioral: Negative for mood changes. The patient is not nervous/anxious.          OBJECTIVE:   /82   Pulse 55   Temp 97.8  F (36.6  C) (Temporal)   Resp 17   Ht 1.905 m (6' 3\")   Wt 126.1 kg (278 lb)   SpO2 99%   BMI 34.75 kg/m      Physical Exam  GENERAL: healthy, alert and no distress  EYES: Eyes grossly normal to inspection, PERRL and conjunctivae and sclerae normal  HENT: ear canals and TM's normal, nose and mouth without ulcers or lesions  NECK: no adenopathy, no asymmetry, masses, or scars and thyroid normal to palpation  RESP: lungs clear to auscultation - no rales, rhonchi or wheezes  CV: regular rate and rhythm, normal S1 S2, no S3 or S4, no murmur, click or rub, no peripheral edema and peripheral " pulses strong  ABDOMEN: soft, nontender, no hepatosplenomegaly, no masses and bowel sounds normal  MS: no gross musculoskeletal defects noted, no edema  SKIN: no suspicious lesions or rashes  NEURO: Normal strength and tone, mentation intact and speech normal  PSYCH: mentation appears normal, affect normal/bright      ASSESSMENT/PLAN:     1. Encounter for routine adult health examination without abnormal findings    2. Attention deficit disorder, unspecified hyperactivity presence  Following with psychiatry.  Not currently taking medication due to shortage  Symptoms currently stable    3. Recurrent major depressive disorder, remission status unspecified (H)  Stable, stopped lexapro earlier this year and has been stable    4. Donor of kidney for transplant  Due for labs today    5. Acquired generalized delayed ejaculation  E has been experiencing delayed ejaculation for several years.  He is able to maintain an erection.   Labs today, follow up with urology  - TSH with free T4 reflex; Future  - Adult Urology  Referral; Future  - TSH with free T4 reflex    6. Single kidney  Labs for surveillence    7. Elevated serum creatinine  Labs today for surveillance    8. Screen for colon cance  - Adult GI  Referral - Procedure Only; Future    9. CARDIOVASCULAR SCREENING; LDL GOAL LESS THAN 160  - Lipid Profile (Chol, Trig, HDL, LDL calc); Future  - Comprehensive metabolic panel (BMP + Alb, Alk Phos, ALT, AST, Total. Bili, TP); Future  - Lipid Profile (Chol, Trig, HDL, LDL calc)  - Comprehensive metabolic panel (BMP + Alb, Alk Phos, ALT, AST, Total. Bili, TP)    10. Screening for prostate cancer  - PSA, screen; Future  - PSA, screen            COUNSELING:   Reviewed preventive health counseling, as reflected in patient instructions       Regular exercise       Healthy diet/nutrition        He reports that he has never smoked. He has never used smokeless tobacco.        FRANCO Mercado [facility]  [facility] [place]  Answers for HPI/ROS submitted by the patient on 7/13/2023  If you checked off any problems, how difficult have these problems made it for you to do your work, take care of things at home, or get along with other people?: Not difficult at all  PHQ9 TOTAL SCORE: 5

## 2023-07-14 LAB
ALBUMIN SERPL BCG-MCNC: 4.6 G/DL (ref 3.5–5.2)
ALP SERPL-CCNC: 71 U/L (ref 40–129)
ALT SERPL W P-5'-P-CCNC: 25 U/L (ref 0–70)
ANION GAP SERPL CALCULATED.3IONS-SCNC: 11 MMOL/L (ref 7–15)
AST SERPL W P-5'-P-CCNC: 33 U/L (ref 0–45)
BILIRUB SERPL-MCNC: 0.4 MG/DL
BUN SERPL-MCNC: 18.3 MG/DL (ref 6–20)
CALCIUM SERPL-MCNC: 9.3 MG/DL (ref 8.6–10)
CHLORIDE SERPL-SCNC: 103 MMOL/L (ref 98–107)
CHOLEST SERPL-MCNC: 177 MG/DL
CREAT SERPL-MCNC: 1.28 MG/DL (ref 0.67–1.17)
DEPRECATED HCO3 PLAS-SCNC: 27 MMOL/L (ref 22–29)
GFR SERPL CREATININE-BSD FRML MDRD: 66 ML/MIN/1.73M2
GLUCOSE SERPL-MCNC: 87 MG/DL (ref 70–99)
HDLC SERPL-MCNC: 46 MG/DL
LDLC SERPL CALC-MCNC: 106 MG/DL
NONHDLC SERPL-MCNC: 131 MG/DL
POTASSIUM SERPL-SCNC: 4.3 MMOL/L (ref 3.4–5.3)
PROT SERPL-MCNC: 7.3 G/DL (ref 6.4–8.3)
PSA SERPL DL<=0.01 NG/ML-MCNC: 1.18 NG/ML (ref 0–3.5)
SODIUM SERPL-SCNC: 141 MMOL/L (ref 136–145)
TRIGL SERPL-MCNC: 126 MG/DL
TSH SERPL DL<=0.005 MIU/L-ACNC: 1.75 UIU/ML (ref 0.3–4.2)

## 2023-07-18 NOTE — RESULT ENCOUNTER NOTE
Kunal-  Here are your recent results.     Your labs look normal with the exception of elevated LDL cholesterol and elevated creatinine ( a kidney function test).  These values have remained stable.Eating a healthy diet, exercising frequently and weight loss can be helpful in maintaining healthy cholesterol We can monitor your creatinine every 6 months.     Tex Goodwin PA-C

## 2023-07-19 NOTE — TELEPHONE ENCOUNTER
MEDICAL RECORDS REQUEST   [facility]  Urology Clinic  [address] [place] [address]  PHONE: [phone]  Fax: [phone]        FUTURE VISIT INFORMATION                                                   Kunal Rao, : 1965 scheduled for future visit at [facility]    APPOINTMENT INFORMATION:    Date: 2023    Provider:  Cody Bailey PA-C    Reason for Visit/Diagnosis: Acquired generalized delayed ejaculation    REFERRAL INFORMATION:    Referring provider:  Tex Goodwin PA-C in [facility]    RECORDS REQUESTED FOR VISIT                                                     NOTES  STATUS/DETAILS   OFFICE NOTE from referring provider  yes, 2023 -- Tex Goodwin PA-C in [facility]   MEDICATION LIST  yes     PRE-VISIT CHECKLIST      Joint diagnostic appointment coordinated correctly          (ensure right order & amount of time) Yes   RECORD COLLECTION COMPLETE Yes

## 2023-08-04 ENCOUNTER — TELEPHONE (OUTPATIENT)
Dept: GASTROENTEROLOGY | Facility: CLINIC | Age: 58
End: 2023-08-04
Payer: COMMERCIAL

## 2023-08-04 NOTE — TELEPHONE ENCOUNTER
"Endoscopy Scheduling Screen    Have you had a positive Covid test in the last 14 days?  No    Are you active on MyChart?   Yes    What insurance is in the chart?  Other:  [employer]    Ordering/Referring Provider: Tex Goodwin PA-C     (If ordering provider performs procedure, schedule with ordering provider unless otherwise instructed. )    BMI: Estimated body mass index is 34.75 kg/m  as calculated from the following:    Height as of 7/13/23: 1.905 m (6' 3\").    Weight as of 7/13/23: 126.1 kg (278 lb).     Sedation Ordered  moderate sedation.   If patient BMI > 50 do not schedule in ASC.    Are you taking any prescription medications for pain?   No    Are you taking methadone or Suboxone?  No    Do you have a history of malignant hyperthermia or adverse reaction to anesthesia?  No    (Females) Are you currently pregnant?   No     Have you been diagnosed or told you have pulmonary hypertension?   No    Do you have an LVAD?  No    Have you been told you have moderate to severe sleep apnea?  No    Have you been told you have COPD, asthma, or any other lung disease?  No    Do you have any heart conditions?  No     Have you ever had or are you awaiting a heart or lung transplant?   No    Have you had a stroke or transient ischemic attack (TIA aka \"mini stroke\" in the last 6 months?   No    Have you been diagnosed with or been told you have cirrhosis of the liver?   No    Are you currently on dialysis?   No    Do you need assistance transferring?   No    BMI: Estimated body mass index is 34.75 kg/m  as calculated from the following:    Height as of 7/13/23: 1.905 m (6' 3\").    Weight as of 7/13/23: 126.1 kg (278 lb).     Is patients BMI > 40 and scheduling location [facility]?  No    Do you take the medication Phentermine, Ozempic or Wegovy?  No    Do you take the medication Naltrexone?  No    Do you take blood thinners?  No      Prep   Are you currently on dialysis or do you have chronic kidney disease?  No    Do you " have a diagnosis of diabetes?  No    Do you have a diagnosis of cystic fibrosis (CF)?  No    On a regular basis do you go 3 -5 days between bowel movements?  No    BMI > 40?  No    Preferred Pharmacy:      [id number] - [place] - [id number]  [id number]  [place] [address]  Phone: [phone] Fax: [phone]      Final Scheduling Details   Colonoscopy prep sent?  MiraLAX (No Mag Citrate)    Procedure scheduled  Colonoscopy    Surgeon:  KYLEIGH     Date of procedure:  10/3     Schedule PAC:   No    Location  [facility]    Sedation   Moderate Sedation    Patient Reminders:   You will receive a call from a Nurse to review instructions and health history.  This assessment must be completed prior to your procedure.  Failure to complete the Nurse assessment may result in the procedure being cancelled.      On the day of your procedure, please designate an adult(s) who can drive you home stay with you for the next 24 hours. The medicines used in the exam will make you sleepy. You will not be able to drive.      You cannot take public transportation, ride share services, or non-medical taxi service without a responsible caregiver.  Medical transport services are allowed with the requirement that a responsible caregiver will receive you at your destination.  We require that drivers and caregivers are confirmed prior to your procedure.

## 2023-08-11 NOTE — PROGRESS NOTES
Subjective     REQUESTING PROVIDER  Tex Goodwin PA-C    REASON FOR VISIT  Delayed ejaculation    HISTORY OF PRESENT ILLNESS  Mr. Rao is a pleasant 57 year old male with a past medical history significant for ADD, depression, and solitary kidney following donation and who presents today for discussion of his multiyear history of delayed ejaculation without erectile dysfunction.  I personally viewed the family practice visit from 7/13/2023 in preparation for this visit.    Today:  Main issue is reaching orgasm  Was 5-10 minutes, now 45-90 minutes with a partner, No orgasm with a partner int he last 6 years , last attempt was last year  Still 5-10 minutes with masturbation   Happening for about 11 years  On Prozac and Wellbutrin historically   July 2011: Kidney donation  Shortly after, marital problems  Med switch in Feb 2014, from Prozac to Lexapro 40 mg   Lexapro taper until August 2022 when came off, did not lead to any change   On adderal till last fall, low dose  No pelvic pain, gross hematuria, or hematospermia  Vasectomy and hydrocele repair  No baseline change to penile sensation    REVIEW OF SYSTEMS  Review of Systems   Constitutional:  Negative for activity change and unexpected weight change.   HENT:  Positive for tinnitus (Chronic, constant). Negative for ear pain.    Eyes:  Negative for visual disturbance.   Respiratory:  Negative for shortness of breath.    Cardiovascular:  Negative for chest pain.   Gastrointestinal:  Positive for constipation (Intermittent). Negative for abdominal pain.   Genitourinary:  Negative for hematuria.   Musculoskeletal:  Negative for back pain.   Neurological:  Negative for dizziness, light-headedness and numbness.   Hematological:  Negative for adenopathy.   Psychiatric/Behavioral:  Negative for sleep disturbance.      Social History:  Denies any history of or current smoking     Family History:  Denies any known family history of urologic malignancy      Objective     PHYSICAL EXAM  Physical Exam  Constitutional:       Appearance: Normal appearance.   HENT:      Head: Normocephalic and atraumatic.      Nose: No congestion.      Mouth/Throat:      Mouth: Mucous membranes are moist.   Eyes:      General:         Right eye: No discharge.         Left eye: No discharge.   Pulmonary:      Effort: No respiratory distress.   Abdominal:      General: There is no distension.   Musculoskeletal:         General: No deformity.   Skin:     General: Skin is warm and dry.   Neurological:      Mental Status: He is alert and oriented to person, place, and time.   Psychiatric:         Mood and Affect: Mood normal.         Behavior: Behavior normal.       LABORATORY   Latest Reference Range & Units 07/13/23 16:45   PSA Tumor Marker 0.00 - 3.50 ng/mL 1.18   Triglycerides <150 mg/dL 126   TSH 0.30 - 4.20 uIU/mL 1.75     Lab Results   Component Value Date/Time    PSA 1.18 07/13/2023 04:45 PM     Assessment & Plan   Delayed ejaculation    It was my pleasure to meet with Mr. Rao in the office today in regards to his delayed ejaculation for the past 11 years after reviewing his clinical situation.  I first acknowledged the fact that there is quite a bit going on here and I am not necessarily convinced that this is a  purely physical issue.  While I do believe it is very reasonable for us to look into the possible physical causes of this like low testosterone or elevated prolactin, it sounds like there is quite a bit of undiscussed or unresolved baggage and trauma wrapped up in his marital issues for approximately a decade ago.  While he does see a therapist, this is not a therapist who is comfortable discussing sexual issues, and I believe he could benefit significantly from seeing a specific sexual health therapist.  With this in mind, I have referred him to the sexual health clinic here at the [facility], however also recommend the [facility] team if the [facility] team is not  taking new referrals.    In regards to a work-up for this issue, I do believe it is reasonable for us to check his prolactin and testosterone levels ideally in the morning as close to 8 AM as possible, and we did also discuss the possible benefit of a low dose of cabergoline, 0.5 mg twice weekly.  Given that he does not have issues reaching orgasm on his own with masturbation, my concern that this is a physical issue is very low, and believe we should put him through the testing before we consider a trial of this medication.    Mr. Rao expressed understanding and agreement to the above discussion and plan and all of his questions were answered to his satisfaction. A business card was provided as a point of contact.     PLAN  First available morning testosterone and prolactin testing  Referral to the sexual health center     Signed by:    Coyd Bailey PA-C    I spent a total of 35 minutes spent on the date of the encounter doing chart review, history and exam, documentation, and further activities as noted above.

## 2023-08-14 ENCOUNTER — PRE VISIT (OUTPATIENT)
Dept: UROLOGY | Facility: CLINIC | Age: 58
End: 2023-08-14

## 2023-08-14 ENCOUNTER — OFFICE VISIT (OUTPATIENT)
Dept: UROLOGY | Facility: CLINIC | Age: 58
End: 2023-08-14
Attending: PHYSICIAN ASSISTANT
Payer: COMMERCIAL

## 2023-08-14 VITALS — SYSTOLIC BLOOD PRESSURE: 125 MMHG | DIASTOLIC BLOOD PRESSURE: 84 MMHG | OXYGEN SATURATION: 99 % | HEART RATE: 55 BPM

## 2023-08-14 DIAGNOSIS — F52.32 ACQUIRED GENERALIZED DELAYED EJACULATION: Primary | ICD-10-CM

## 2023-08-14 PROCEDURE — 99244 OFF/OP CNSLTJ NEW/EST MOD 40: CPT | Performed by: STUDENT IN AN ORGANIZED HEALTH CARE EDUCATION/TRAINING PROGRAM

## 2023-08-14 ASSESSMENT — ENCOUNTER SYMPTOMS
ABDOMINAL PAIN: 0
ACTIVITY CHANGE: 0
LIGHT-HEADEDNESS: 0
UNEXPECTED WEIGHT CHANGE: 0
ADENOPATHY: 0
HEMATURIA: 0
BACK PAIN: 0
DIZZINESS: 0
NUMBNESS: 0
CONSTIPATION: 1
SHORTNESS OF BREATH: 0
SLEEP DISTURBANCE: 0

## 2023-08-14 NOTE — LETTER
8/14/2023       RE: Kunal Rao  [address]  [facility] [address]     Dear Colleague,    Thank you for referring your patient, Kunal Rao, to the [facility] [place] [facility]. Please see a copy of my visit note below.    Subjective    REQUESTING PROVIDER  Tex Goodwin PA-C    REASON FOR VISIT  Delayed ejaculation    HISTORY OF PRESENT ILLNESS  Mr. Rao is a pleasant 57 year old male with a past medical history significant for ADD, depression, and solitary kidney following donation and who presents today for discussion of his multiyear history of delayed ejaculation without erectile dysfunction.  I personally viewed the family practice visit from 7/13/2023 in preparation for this visit.    Today:  Main issue is reaching orgasm  Was 5-10 minutes, now 45-90 minutes with a partner, No orgasm with a partner int he last 6 years , last attempt was last year  Still 5-10 minutes with masturbation   Happening for about 11 years  On Prozac and Wellbutrin historically   July 2011: Kidney donation  Shortly after, marital problems  Med switch in Feb 2014, from Prozac to Lexapro 40 mg   Lexapro taper until August 2022 when came off, did not lead to any change   On adderal till last fall, low dose  No pelvic pain, gross hematuria, or hematospermia  Vasectomy and hydrocele repair  No baseline change to penile sensation    REVIEW OF SYSTEMS  Review of Systems   Constitutional:  Negative for activity change and unexpected weight change.   HENT:  Positive for tinnitus (Chronic, constant). Negative for ear pain.    Eyes:  Negative for visual disturbance.   Respiratory:  Negative for shortness of breath.    Cardiovascular:  Negative for chest pain.   Gastrointestinal:  Positive for constipation (Intermittent). Negative for abdominal pain.   Genitourinary:  Negative for hematuria.   Musculoskeletal:  Negative for back pain.    Neurological:  Negative for dizziness, light-headedness and numbness.   Hematological:  Negative for adenopathy.   Psychiatric/Behavioral:  Negative for sleep disturbance.      Social History:  Denies any history of or current smoking     Family History:  Denies any known family history of urologic malignancy     Objective    PHYSICAL EXAM  Physical Exam  Constitutional:       Appearance: Normal appearance.   HENT:      Head: Normocephalic and atraumatic.      Nose: No congestion.      Mouth/Throat:      Mouth: Mucous membranes are moist.   Eyes:      General:         Right eye: No discharge.         Left eye: No discharge.   Pulmonary:      Effort: No respiratory distress.   Abdominal:      General: There is no distension.   Musculoskeletal:         General: No deformity.   Skin:     General: Skin is warm and dry.   Neurological:      Mental Status: He is alert and oriented to person, place, and time.   Psychiatric:         Mood and Affect: Mood normal.         Behavior: Behavior normal.       LABORATORY   Latest Reference Range & Units 07/13/23 16:45   PSA Tumor Marker 0.00 - 3.50 ng/mL 1.18   Triglycerides <150 mg/dL 126   TSH 0.30 - 4.20 uIU/mL 1.75     Lab Results   Component Value Date/Time    PSA 1.18 07/13/2023 04:45 PM     Assessment & Plan  Delayed ejaculation    It was my pleasure to meet with Mr. Rao in the office today in regards to his delayed ejaculation for the past 11 years after reviewing his clinical situation.  I first acknowledged the fact that there is quite a bit going on here and I am not necessarily convinced that this is a  purely physical issue.  While I do believe it is very reasonable for us to look into the possible physical causes of this like low testosterone or elevated prolactin, it sounds like there is quite a bit of undiscussed or unresolved baggage and trauma wrapped up in his marital issues for approximately a decade ago.  While he does see a therapist, this is not a  therapist who is comfortable discussing sexual issues, and I believe he could benefit significantly from seeing a specific sexual health therapist.  With this in mind, I have referred him to the sexual health clinic here at the [facility], however also recommend the [facility] team if the [facility] team is not taking new referrals.    In regards to a work-up for this issue, I do believe it is reasonable for us to check his prolactin and testosterone levels ideally in the morning as close to 8 AM as possible, and we did also discuss the possible benefit of a low dose of cabergoline, 0.5 mg twice weekly.  Given that he does not have issues reaching orgasm on his own with masturbation, my concern that this is a physical issue is very low, and believe we should put him through the testing before we consider a trial of this medication.    Mr. Rao expressed understanding and agreement to the above discussion and plan and all of his questions were answered to his satisfaction. A business card was provided as a point of contact.     PLAN  First available morning testosterone and prolactin testing  Referral to the sexual health center     Signed by:    Cody Bailey PA-C    I spent a total of 35 minutes spent on the date of the encounter doing chart review, history and exam, documentation, and further activities as noted above.

## 2023-08-14 NOTE — NURSING NOTE
Kunal Rao is a 57 year old male patient that presents today in clinic for the following:    Chief Complaint   Patient presents with    Establish Care     Pt here to establish care and discuss acquired generalized delayed ejaculation     The patient's allergies and medications were reviewed as noted. A set of vitals were recorded as noted without incident. The patient does not have any other questions for the provider.    Josseline Osborne, EMT at 9:15 AM on 8/14/2023

## 2023-08-15 ENCOUNTER — LAB (OUTPATIENT)
Dept: LAB | Facility: CLINIC | Age: 58
End: 2023-08-15
Payer: COMMERCIAL

## 2023-08-15 DIAGNOSIS — F52.32 ACQUIRED GENERALIZED DELAYED EJACULATION: ICD-10-CM

## 2023-08-15 LAB
PROLACTIN SERPL 3RD IS-MCNC: 15 NG/ML (ref 4–15)
SHBG SERPL-SCNC: 48 NMOL/L (ref 11–80)

## 2023-08-15 PROCEDURE — 36415 COLL VENOUS BLD VENIPUNCTURE: CPT

## 2023-08-15 PROCEDURE — 84270 ASSAY OF SEX HORMONE GLOBUL: CPT

## 2023-08-15 PROCEDURE — 84403 ASSAY OF TOTAL TESTOSTERONE: CPT

## 2023-08-15 PROCEDURE — 84146 ASSAY OF PROLACTIN: CPT

## 2023-08-17 LAB
TESTOST FREE SERPL-MCNC: 4.9 NG/DL
TESTOST SERPL-MCNC: 316 NG/DL (ref 240–950)

## 2023-09-19 ENCOUNTER — TELEPHONE (OUTPATIENT)
Dept: GASTROENTEROLOGY | Facility: CLINIC | Age: 58
End: 2023-09-19
Payer: COMMERCIAL

## 2023-09-19 NOTE — TELEPHONE ENCOUNTER
Caller:   Reason for Reschedule/Cancellation (please be detailed, any staff messages or encounters to note?): SCHEDULED WITH DIFFERENT DOC SAME DAY      Prior to reschedule please review:  Ordering Provider:     ENEDELIA HEWITT     Sedation per order: CS  Does patient have any ASC Exclusions, please identify?:       Notes on Cancelled Procedure:  Procedure: Lower Endoscopy [Colonoscopy]   Date: 10/3  Location: [facility]; [address], [place] [address]  Surgeon: RACHEAL      Rescheduled: Yes  Procedure: Lower Endoscopy [Colonoscopy]   Date: 10/3  Location: [facility]; [address], [place] [id number]  Surgeon: WILBER  Sedation Level Scheduled  MAC,  Reason for Sedation Level PREVIOUS NOTES  Prep/Instructions updated and sent: N       Send In - basket message to [facility] if EUS  procedure is canceled or rescheduled: [ N/A, YES or NO]

## 2023-09-19 NOTE — TELEPHONE ENCOUNTER
Attempted to contact patient in order to complete pre assessment questions.     No answer. Left message to return call to [phone] option 4      Procedure details:    Patient scheduled for Colonoscopy  on 10/3/23.     Arrival time: 1335. Procedure time 1420    Pre op exam needed? N/A    Facility location: [facility]; [address], [place] [address]    Sedation type: MAC - d/t tortuous colon     Indication for procedure: screening       Chart review:     Electronic implanted devices? No    Diabetic? No    Medication review:    Anticoagulants? No    NSAIDS? No    Other medication HOLDING recommendations:  Ferrous Sulfate (iron supplement): HOLD 7 days before procedure.      Prep for procedure:     Bowel prep recommendation: Miralax without magnesium citrate  Due to: standard prep due to recall.    Prep instructions sent via [employer].     Kendra Hein RN  Endoscopy Procedure Pre Assessment RN

## 2023-09-19 NOTE — TELEPHONE ENCOUNTER
"Please offer patient same day 10/3/23 at [facility] with Dr Weinberg under MAC at 2:10 pm.  TIME HELD ON SNAPBOARD    Caller: Pat  Reason for Reschedule/Cancellation (please be detailed, any staff messages or encounters to note?): Patient needs Kendra Alexandra RN  P Endoscopy Scheduling Pool  Patient is scheduled for a Colonoscopy  Date of procedure: 10/3/23  Indication: hx of polyps  Sedation type: Conscious sedation    Upon review in previous colonoscopy report 4/22/22:  \"The colonoscopy was  technically difficult and complex due to significant looping and a tortuous colon. The patient tolerated the procedure fairly well\"    MAC sedation would be recommended per protocol. Please reschedule case under MAC.      Thank you,  Kendra Hein RN  Endoscopy Procedure Pre Assessment RN  [phone] option 4    Prior to reschedule please review:  Ordering Provider: Raile  Sedation per order: CS  Does patient have any ASC Exclusions, please identify?: N      Notes on Cancelled Procedure:  Procedure: Lower Endoscopy [Colonoscopy]   Date: 10/3/23  Location: [facility]; [address], [place] [address]  Surgeon: Justino      Rescheduled: No-left VM and sent MC    Case in depot       [facility]  procedure is canceled or rescheduled: [ N/A, YES or NO] NA               "

## 2023-09-25 NOTE — TELEPHONE ENCOUNTER
Pre assessment completed for upcoming procedure.   (Please see previous telephone encounter notes for complete details)    Patient  returned call.       Procedure details:    Arrival time and facility location reviewed.    Pre op exam needed? N/A    Designated  policy reviewed. Instructed to have someone stay 24 hours post procedure.     COVID policy reviewed.      Medication review:    Medications reviewed. Please see supporting documentation below. Holding recommendations discussed (if applicable).       Prep for procedure:     Procedure prep instructions reviewed.        Additional information needed?  N/A      Patient  verbalized understanding and had no questions or concerns at this time.      Karla Rodriguez RN  Endoscopy Procedure Pre Assessment RN  [phone] option 4

## 2023-09-25 NOTE — TELEPHONE ENCOUNTER
Second call attempt to complete pre assessment.     No answer.  Left message to return call to [phone] #4 within 24 hours or risk procedure being cancelled.     Additional information needed?  N/A      Lyn Gutierrez RN  Endoscopy Procedure Pre Assessment RN

## 2023-10-03 ENCOUNTER — HOSPITAL ENCOUNTER (OUTPATIENT)
Facility: AMBULATORY SURGERY CENTER | Age: 58
Discharge: HOME | End: 2023-10-03
Attending: INTERNAL MEDICINE
Payer: COMMERCIAL

## 2023-10-03 ENCOUNTER — ANESTHESIA (OUTPATIENT)
Dept: SURGERY | Facility: AMBULATORY SURGERY CENTER | Age: 58
End: 2023-10-03
Payer: COMMERCIAL

## 2023-10-03 ENCOUNTER — ANESTHESIA EVENT (OUTPATIENT)
Dept: SURGERY | Facility: AMBULATORY SURGERY CENTER | Age: 58
End: 2023-10-03
Payer: COMMERCIAL

## 2023-10-03 VITALS
DIASTOLIC BLOOD PRESSURE: 88 MMHG | OXYGEN SATURATION: 98 % | RESPIRATION RATE: 16 BRPM | SYSTOLIC BLOOD PRESSURE: 125 MMHG | TEMPERATURE: 98.1 F

## 2023-10-03 VITALS — HEART RATE: 55 BPM

## 2023-10-03 LAB — COLONOSCOPY: NORMAL

## 2023-10-03 PROCEDURE — 45385 COLONOSCOPY W/LESION REMOVAL: CPT

## 2023-10-03 PROCEDURE — G8907 PT DOC NO EVENTS ON DISCHARG: HCPCS

## 2023-10-03 PROCEDURE — 88305 TISSUE EXAM BY PATHOLOGIST: CPT | Performed by: PATHOLOGY

## 2023-10-03 PROCEDURE — G8918 PT W/O PREOP ORDER IV AB PRO: HCPCS

## 2023-10-03 RX ORDER — SODIUM CHLORIDE, SODIUM LACTATE, POTASSIUM CHLORIDE, CALCIUM CHLORIDE 600; 310; 30; 20 MG/100ML; MG/100ML; MG/100ML; MG/100ML
INJECTION, SOLUTION INTRAVENOUS CONTINUOUS PRN
Status: DISCONTINUED | OUTPATIENT
Start: 2023-10-03 | End: 2023-10-03

## 2023-10-03 RX ORDER — FLUMAZENIL 0.1 MG/ML
0.2 INJECTION, SOLUTION INTRAVENOUS
Status: DISCONTINUED | OUTPATIENT
Start: 2023-10-03 | End: 2023-10-04 | Stop reason: HOSPADM

## 2023-10-03 RX ORDER — PROPOFOL 10 MG/ML
INJECTION, EMULSION INTRAVENOUS PRN
Status: DISCONTINUED | OUTPATIENT
Start: 2023-10-03 | End: 2023-10-03

## 2023-10-03 RX ORDER — ONDANSETRON 2 MG/ML
4 INJECTION INTRAMUSCULAR; INTRAVENOUS
Status: DISCONTINUED | OUTPATIENT
Start: 2023-10-03 | End: 2023-10-04 | Stop reason: HOSPADM

## 2023-10-03 RX ORDER — PROPOFOL 10 MG/ML
INJECTION, EMULSION INTRAVENOUS CONTINUOUS PRN
Status: DISCONTINUED | OUTPATIENT
Start: 2023-10-03 | End: 2023-10-03

## 2023-10-03 RX ORDER — NALOXONE HYDROCHLORIDE 0.4 MG/ML
0.2 INJECTION, SOLUTION INTRAMUSCULAR; INTRAVENOUS; SUBCUTANEOUS
Status: DISCONTINUED | OUTPATIENT
Start: 2023-10-03 | End: 2023-10-04 | Stop reason: HOSPADM

## 2023-10-03 RX ORDER — NALOXONE HYDROCHLORIDE 0.4 MG/ML
0.4 INJECTION, SOLUTION INTRAMUSCULAR; INTRAVENOUS; SUBCUTANEOUS
Status: DISCONTINUED | OUTPATIENT
Start: 2023-10-03 | End: 2023-10-04 | Stop reason: HOSPADM

## 2023-10-03 RX ORDER — ONDANSETRON 2 MG/ML
4 INJECTION INTRAMUSCULAR; INTRAVENOUS EVERY 6 HOURS PRN
Status: DISCONTINUED | OUTPATIENT
Start: 2023-10-03 | End: 2023-10-04 | Stop reason: HOSPADM

## 2023-10-03 RX ORDER — PROCHLORPERAZINE MALEATE 10 MG
10 TABLET ORAL EVERY 6 HOURS PRN
Status: DISCONTINUED | OUTPATIENT
Start: 2023-10-03 | End: 2023-10-04 | Stop reason: HOSPADM

## 2023-10-03 RX ORDER — ONDANSETRON 4 MG/1
4 TABLET, ORALLY DISINTEGRATING ORAL EVERY 6 HOURS PRN
Status: DISCONTINUED | OUTPATIENT
Start: 2023-10-03 | End: 2023-10-04 | Stop reason: HOSPADM

## 2023-10-03 RX ORDER — LIDOCAINE 40 MG/G
CREAM TOPICAL
Status: DISCONTINUED | OUTPATIENT
Start: 2023-10-03 | End: 2023-10-04 | Stop reason: HOSPADM

## 2023-10-03 RX ORDER — LIDOCAINE HYDROCHLORIDE 20 MG/ML
INJECTION, SOLUTION INFILTRATION; PERINEURAL PRN
Status: DISCONTINUED | OUTPATIENT
Start: 2023-10-03 | End: 2023-10-03

## 2023-10-03 RX ADMIN — PROPOFOL 150 MCG/KG/MIN: 10 INJECTION, EMULSION INTRAVENOUS at 13:57

## 2023-10-03 RX ADMIN — PROPOFOL 100 MG: 10 INJECTION, EMULSION INTRAVENOUS at 13:57

## 2023-10-03 RX ADMIN — SODIUM CHLORIDE, SODIUM LACTATE, POTASSIUM CHLORIDE, CALCIUM CHLORIDE: 600; 310; 30; 20 INJECTION, SOLUTION INTRAVENOUS at 13:55

## 2023-10-03 RX ADMIN — LIDOCAINE HYDROCHLORIDE 40 MG: 20 INJECTION, SOLUTION INFILTRATION; PERINEURAL at 13:57

## 2023-10-03 NOTE — ANESTHESIA PREPROCEDURE EVALUATION
Anesthesia Pre-Procedure Evaluation    Patient: Kunal Rao   MRN: [id number] : 1965        Procedure : Procedure(s):  Colonoscopy with CO2 insufflation  COLONOSCOPY, FLEXIBLE, WITH LESION REMOVAL USING SNARE          Past Medical History:   Diagnosis Date    ADD (attention deficit disorder)     Depressive disorder     Under treatment    Kidney donor     pt donated kidney for transplantation    Major depression, recurrent (H24)     THUMB FRACTURE     fracture left thumb, closed reduction      Past Surgical History:   Procedure Laterality Date    COLONOSCOPY  2011    sigmoid diverticulosis - repeat 10 years    COLONOSCOPY N/A 2022    Procedure: COLONOSCOPY, FLEXIBLE, WITH LESION REMOVAL USING SNARE;  Surgeon: Sean Lovelace DO;  Location: [facility] OR    COLONOSCOPY WITH CO2 INSUFFLATION N/A 2022    Procedure: COLONOSCOPY, WITH CO2 INSUFFLATION;  Surgeon: Sean Lovelace DO;  Location: [facility] OR    EYE SURGERY      Lasik    GENITOURINARY SURGERY      Vasectomy    LAPAROSCOPIC DONOR HAND ASSISTED KIDNEY LIVING RELATED  2011    Procedure:LAPAROSCOPIC DONOR HAND ASSISTED KIDNEY LIVING RELATED; RIGHT KIDNEY, ON-Q PUMP PLACEMENT; Surgeon:RUSSELL CHARLES; Location:[facility] OR    MOHS MICROGRAPHIC PROCEDURE      ORTHOPEDIC SURGERY      closed reduction L thumb    SURGICAL HISTORY OF -       reduction of thumb fracture      No Known Allergies   Social History     Tobacco Use    Smoking status: Never    Smokeless tobacco: Never   Substance Use Topics    Alcohol use: Not Currently     Alcohol/week: 8.0 - 10.0 standard drinks of alcohol     Comment: Not currently      Wt Readings from Last 1 Encounters:   23 126.1 kg (278 lb)        Anesthesia Evaluation            ROS/MED HX  ENT/Pulmonary:  - neg pulmonary ROS     Neurologic:  - neg neurologic ROS     Cardiovascular:  - neg cardiovascular ROS     METS/Exercise Tolerance: >4 METS    Hematologic:  - neg hematologic  ROS      Musculoskeletal:  - neg musculoskeletal ROS     GI/Hepatic:  - neg GI/hepatic ROS     Renal/Genitourinary: Comment: Hx of kidney donation - neg Renal ROS     Endo:  - neg endo ROS     Psychiatric/Substance Use:  - neg psychiatric ROS     Infectious Disease:  - neg infectious disease ROS     Malignancy:  - neg malignancy ROS     Other:  - neg other ROS          Physical Exam    Airway  airway exam normal      Mallampati: II   TM distance: > 3 FB   Neck ROM: full   Mouth opening: > 3 cm    Respiratory Devices and Support         Dental       (+) Completely normal teeth      Cardiovascular          Rhythm and rate: regular and normal     Pulmonary   pulmonary exam normal        breath sounds clear to auscultation           OUTSIDE LABS:  CBC:   Lab Results   Component Value Date    WBC 8.1 10/22/2019    WBC 6.9 10/30/2014    HGB 14.3 10/11/2021    HGB 14.9 10/22/2019    HCT 45.0 10/22/2019    HCT 43.1 10/30/2014     10/22/2019     10/30/2014     BMP:   Lab Results   Component Value Date     07/13/2023     02/17/2022    POTASSIUM 4.3 07/13/2023    POTASSIUM 4.1 02/17/2022    CHLORIDE 103 07/13/2023    CHLORIDE 106 02/17/2022    CO2 27 07/13/2023    CO2 26 02/17/2022    BUN 18.3 07/13/2023    BUN 22 02/17/2022    CR 1.28 (H) 07/13/2023    CR 1.19 02/17/2022    GLC 87 07/13/2023    GLC 82 02/17/2022     COAGS:   Lab Results   Component Value Date    PTT 26 07/06/2011    INR 0.92 07/06/2011     POC: No results found for: BGM, HCG, HCGS  HEPATIC:   Lab Results   Component Value Date    ALBUMIN 4.6 07/13/2023    PROTTOTAL 7.3 07/13/2023    ALT 25 07/13/2023    AST 33 07/13/2023    ALKPHOS 71 07/13/2023    BILITOTAL 0.4 07/13/2023     OTHER:   Lab Results   Component Value Date    BRENNAN 9.3 07/13/2023    TSH 1.75 07/13/2023    CRP 6.7 10/22/2019    SED 10 10/22/2019       Anesthesia Plan    ASA Status:  1    NPO Status:  NPO Appropriate    Anesthesia Type: MAC.     - Reason for MAC: immobility  needed, straight local not clinically adequate   Induction: Intravenous.   Maintenance: TIVA.        Consents    Anesthesia Plan(s) and associated risks, benefits, and realistic alternatives discussed. Questions answered and patient/representative(s) expressed understanding.     - Discussed:     - Discussed with:  Patient      - Extended Intubation/Ventilatory Support Discussed: No.      - Patient is DNR/DNI Status: No     Use of blood products discussed: No .     Postoperative Care    Pain management: IV analgesics, Oral pain medications, Multi-modal analgesia.   PONV prophylaxis: Ondansetron (or other 5HT-3), Background Propofol Infusion     Comments:                Dread Young MD

## 2023-10-03 NOTE — ANESTHESIA CARE TRANSFER NOTE
Patient: Kunal Rao    Procedure: Procedure(s):  Colonoscopy with CO2 insufflation  COLONOSCOPY, FLEXIBLE, WITH LESION REMOVAL USING SNARE       Diagnosis: Screen for colon cancer [Z12.11]  Diagnosis Additional Information: No value filed.    Anesthesia Type:   No value filed.     Note:    Oropharynx: oropharynx clear of all foreign objects and spontaneously breathing  Level of Consciousness: drowsy  Oxygen Supplementation: room air    Independent Airway: airway patency satisfactory and stable  Dentition: dentition unchanged  Vital Signs Stable: post-procedure vital signs reviewed and stable  Report to RN Given: handoff report given  Patient transferred to: Phase II    Handoff Report: Identifed the Patient, Identified the Reponsible Provider, Reviewed the pertinent medical history, Discussed the surgical course, Reviewed Intra-OP anesthesia mangement and issues during anesthesia, Set expectations for post-procedure period and Allowed opportunity for questions and acknowledgement of understanding      Vitals:  Vitals Value Taken Time   BP     Temp     Pulse     Resp     SpO2         Electronically Signed By: SAADIA Briones CRNA  October 3, 2023  2:24 PM

## 2023-10-03 NOTE — ANESTHESIA POSTPROCEDURE EVALUATION
Patient: Kunal Rao    Procedure: Procedure(s):  Colonoscopy with CO2 insufflation  COLONOSCOPY, FLEXIBLE, WITH LESION REMOVAL USING SNARE       Anesthesia Type:  No value filed.    Note:  Disposition: Outpatient   Postop Pain Control: Uneventful            Sign Out: Well controlled pain   PONV: No   Neuro/Psych: Uneventful            Sign Out: Acceptable/Baseline neuro status   Airway/Respiratory: Uneventful            Sign Out: Acceptable/Baseline resp. status   CV/Hemodynamics: Uneventful            Sign Out: Acceptable CV status   Other NRE: NONE   DID A NON-ROUTINE EVENT OCCUR? No           Last vitals:  Vitals Value Taken Time   /88 10/03/23 1438   Temp     Pulse     Resp 16 10/03/23 1438   SpO2 98 % 10/03/23 1438       Electronically Signed By: Dread Young MD  October 3, 2023  3:08 PM

## 2023-12-01 ENCOUNTER — OFFICE VISIT (OUTPATIENT)
Dept: FAMILY MEDICINE | Facility: CLINIC | Age: 58
End: 2023-12-01
Payer: COMMERCIAL

## 2023-12-01 VITALS
DIASTOLIC BLOOD PRESSURE: 76 MMHG | OXYGEN SATURATION: 98 % | RESPIRATION RATE: 13 BRPM | WEIGHT: 284 LBS | BODY MASS INDEX: 35.5 KG/M2 | TEMPERATURE: 97.7 F | HEART RATE: 58 BPM | SYSTOLIC BLOOD PRESSURE: 120 MMHG

## 2023-12-01 DIAGNOSIS — S61.212D LACERATION OF RIGHT MIDDLE FINGER WITHOUT FOREIGN BODY WITHOUT DAMAGE TO NAIL, SUBSEQUENT ENCOUNTER: Primary | ICD-10-CM

## 2023-12-01 PROCEDURE — S0630 REMOVAL OF SUTURES: HCPCS | Performed by: PHYSICIAN ASSISTANT

## 2023-12-01 PROCEDURE — 99213 OFFICE O/P EST LOW 20 MIN: CPT | Mod: 25 | Performed by: PHYSICIAN ASSISTANT

## 2023-12-01 ASSESSMENT — PAIN SCALES - GENERAL: PAINLEVEL: NO PAIN (0)

## 2023-12-01 NOTE — PROGRESS NOTES
Assessment & Plan     Laceration of right middle finger without foreign body without damage to nail, subsequent encounter  Sutures removed without difficulty. Well healed.   - REMOVAL OF SUTURES    Joselin Monge PA-C on 12/1/2023 at 11:20 AM    [facility] YASMIN Valenzuela is a 58 year old, presenting for the following health issues:  Suture Removal        12/1/2023    11:07 AM   Additional Questions   Roomed by Sergio OBREGON       Suture Removal  This is a new problem. The current episode started 1 to 4 weeks ago (placed 11/22/2023). Nothing aggravates the symptoms.     Seen at [facility] ED 11/23 for R 3rd finger laceration sustained while cutting squash with mandolin  Repaired using 3 interrupted sutures  Recommended removal in 7 days   Tdap up to date      Review of Systems  Constitutional, HEENT, cardiovascular, pulmonary, gi and gu systems are negative, except as otherwise noted.      Objective    /76   Pulse 58   Temp 97.7  F (36.5  C) (Tympanic)   Resp 13   Wt 128.8 kg (284 lb)   SpO2 98%   BMI 35.50 kg/m    Body mass index is 35.5 kg/m .  Physical Exam  GENERAL: healthy, alert and no distress  SKIN: 3 interrupted sutures placed over healed laceration tip of right 3rd finger, wound edges well approximated without drainage or bleeding.   PSYCH: mentation appears normal, affect normal/bright      Area cleaned with alcohol swab. 3 interrupted sutures removed using pickups and suture scissors without difficulty. Patient tolerated this well. Bandaid was applied.

## 2023-12-05 ENCOUNTER — NURSE TRIAGE (OUTPATIENT)
Dept: NURSING | Facility: CLINIC | Age: 58
End: 2023-12-05
Payer: COMMERCIAL

## 2023-12-05 DIAGNOSIS — U07.1 INFECTION DUE TO COVID-19 VIRUS VARIANT OF CONCERN: Primary | ICD-10-CM

## 2023-12-06 NOTE — TELEPHONE ENCOUNTER
RN COVID TREATMENT VISIT  12/06/23      The patient has been triaged and does not require a higher level of care.    Kunal Rao  58 year old  Current weight? 284lb    Has the patient been seen by a primary care provider at an [facility] or [facility] within the past two years? Yes.   Have you been in close proximity to/do you have a known exposure to a person with a confirmed case of influenza? No.     General treatment eligibility:  Date of positive COVID test (PCR or at home)?  12/5/2023    Are you or have you been hospitalized for this COVID-19 infection? No.   Have you received monoclonal antibodies or antiviral treatment for COVID-19 since this positive test? No.   Do you have any of the following conditions that place you at risk of being very sick from COVID-19?   - Age 50 years or older  Yes, patient has at least one high risk condition as noted above.     Current COVID symptoms:   - fever or chills  - muscle or body aches  - headache  - congestion or runny nose  Yes. Patient has at least one symptom as selected.     How many days since symptoms started? 5 days or less. Established patient, 12 years or older weighing at least 88.2 lbs, who has symptoms that started in the past 5 days, has not been hospitalized nor received treatment already, and is at risk for being very sick from COVID-19.     Treatment eligibility by RN:  Are you currently pregnant or nursing? No  Do you have a clinically significant hypersensitivity to nirmatrelvir or ritonavir, or toxic epidermal necrolysis (TEN) or Mcbride-Marcus Syndrome? No  Do you have a history of hepatitis, any hepatic impairment on the Problem List (such as Child-Farley Class C, cirrhosis, fatty liver disease, alcoholic liver disease), or was the last liver lab (hepatic panel, ALT, AST, ALK Phos, bilirubin) elevated in the past 6 months? No  Do you have any history of severe renal impairment (eGFR < 30mL/min)? No    Is patient  eligible to continue? Yes, patient meets all eligibility requirements for the RN COVID treatment (as denoted by all no responses above).     Current Outpatient Medications   Medication Sig Dispense Refill    amphetamine-dextroamphetamine (ADDERALL) 5 MG tablet Take 5 mg by mouth daily Pt may take 10MG. Pt is unsure (Patient not taking: Reported on 7/13/2023)      cholecalciferol (VITAMIN D) 1000 UNIT tablet Take 1 tablet (1,000 Units) by mouth daily (Patient not taking: Reported on 8/14/2023) 100 tablet 3    ferrous sulfate 325 (65 FE) MG tablet Take 1 tablet (325 mg) by mouth daily (with breakfast) (Patient not taking: Reported on 8/14/2023) 30 tablet 2    Glucosamine Sulfate 500 MG TABS Take 1,000 mg by mouth Take 2 500 mg tabs daily (Patient not taking: Reported on 8/14/2023)      omega 3 1000 MG CAPS Take 1 g by mouth daily (Patient not taking: Reported on 8/14/2023) 90 capsule     vitamin B complex with vitamin C (STRESS TAB) tablet Take 1 tablet by mouth daily (Patient not taking: Reported on 8/14/2023)         Medications from List 1 of the standing order (on medications that exclude the use of Paxlovid) that patient is taking: NONE. Is patient taking [place]'s Wort? No  Is patient taking [place] Wort or any meds from List 1? No.   Medications from List 2 of the standing order (on meds that provider needs to adjust) that patient is taking: NONE. Is patient on any of the meds from List 2? No.   Medications from List 3 of standing order (on meds that a RN needs to adjust) that patient is taking: NONE. Is patient on any meds from List 3? No.     Paxlovid has an approximate 90% reduction in hospitalization. Paxlovid can possibly cause altered sense of taste, diarrhea (loose, watery stools), high blood pressure, muscle aches.     Would patient like a Paxlovid prescription?   Yes.   Lab Results   Component Value Date    GFRESTIMATED 66 07/13/2023       Was last eGFR reduced? No, eGFR 60 or greater/ No Result on  record. Patient can receive the normal renal function dose. Paxlovid Rx sent to [place] pharmacy   [facility]    Temporary change to home medications: None    All medication adjustments (holds, etc) were discussed with the patient and patient was asked to repeat back (teachback) their med adjustment.  Did patient understand med adjustment? No medication adjustments needed.         Reviewed the following instructions with the patient:    Paxlovid (nimatrelvir and ritonavir)    How it works  Two medicines (nirmatrelvir and ritonavir) are taken together. They stop the virus from growing. Less amount of virus is easier for your body to fight.    How to take  Medicine comes in a daily container with both medicine tablets. Take by mouth twice daily (once in the morning, once at night) for 5 days.  The number of tablets to take varies by patient.  Don't chew or break capsules. Swallow whole.    When to take  Take as soon as possible after positive COVID-19 test result, and within 5 days of your first symptoms.    Possible side effects  Can cause altered sense of taste, diarrhea (loose, watery stools), high blood pressure, muscle aches.    Cody Palacios RN

## 2023-12-06 NOTE — TELEPHONE ENCOUNTER
Triage Call:    Caller: Patient  Patient tested positive for COVID At home., symptoms started today, about noon.    Has been seeing provider Tex Goodwin as assigned PCP for the last 3 years.      COVID Positive/Requesting COVID treatment    Patient is positive for COVID and requesting treatment options.    Date of positive COVID test (PCR or at home)? 12/5/23  Current COVID symptoms: fever or chills, muscle or body aches, sore throat, and congestion or runny nose  Date COVID symptoms began: 12/5/23    Message should be routed to clinic RN pool. Best phone number to use for call back: [phone]    Protocol Recommended Disposition: RN Protocol.    Caller verbalized understanding of instructions and questions answered.      Kendra Corado RN on 12/5/2023 at 8:06 PM      Reason for Disposition   [1] HIGH RISK patient (e.g., weak immune system, age > 64 years, obesity with BMI 30 or higher, pregnant, chronic lung disease or other chronic medical condition) AND [2] COVID symptoms (e.g., cough, fever)  (Exceptions: Already seen by PCP and no new or worsening symptoms.)    Additional Information   Negative: SEVERE difficulty breathing (e.g., struggling for each breath, speaks in single words)   Negative: Difficult to awaken or acting confused (e.g., disoriented, slurred speech)   Negative: Bluish (or gray) lips or face now   Negative: Shock suspected (e.g., cold/pale/clammy skin, too weak to stand, low BP, rapid pulse)   Negative: Sounds like a life-threatening emergency to the triager   Negative: SEVERE or constant chest pain or pressure  (Exception: Mild central chest pain, present only when coughing.)   Negative: MODERATE difficulty breathing (e.g., speaks in phrases, SOB even at rest, pulse 100-120)   Negative: [1] Headache AND [2] stiff neck (can't touch chin to chest)   Negative: Oxygen level (e.g., pulse oximetry) 90 percent or lower   Negative: Chest pain or pressure  (Exception: MILD central chest pain,  present only when coughing.)   Negative: [1] Drinking very little AND [2] dehydration suspected (e.g., no urine > 12 hours, very dry mouth, very lightheaded)   Negative: Patient sounds very sick or weak to the triager   Negative: MILD difficulty breathing (e.g., minimal/no SOB at rest, SOB with walking, pulse <100)   Negative: Fever > 103 F (39.4 C)   Negative: [1] Fever > 101 F (38.3 C) AND [2] age > 60 years   Negative: [1] Fever > 100.0 F (37.8 C) AND [2] bedridden (e.g., CVA, chronic illness, recovering from surgery)   Negative: Oxygen level (e.g., pulse oximetry) 91 to 94 percent    Protocols used: Coronavirus (COVID-19) Diagnosed or [id number]

## 2024-06-13 ENCOUNTER — PATIENT OUTREACH (OUTPATIENT)
Dept: CARE COORDINATION | Facility: CLINIC | Age: 59
End: 2024-06-13
Payer: COMMERCIAL

## 2024-06-27 ENCOUNTER — PATIENT OUTREACH (OUTPATIENT)
Dept: CARE COORDINATION | Facility: CLINIC | Age: 59
End: 2024-06-27
Payer: COMMERCIAL

## 2024-07-22 ENCOUNTER — OFFICE VISIT (OUTPATIENT)
Dept: FAMILY MEDICINE | Facility: CLINIC | Age: 59
End: 2024-07-22
Payer: COMMERCIAL

## 2024-07-22 VITALS
RESPIRATION RATE: 15 BRPM | HEART RATE: 54 BPM | OXYGEN SATURATION: 98 % | BODY MASS INDEX: 35.19 KG/M2 | WEIGHT: 283 LBS | HEIGHT: 75 IN | SYSTOLIC BLOOD PRESSURE: 138 MMHG | TEMPERATURE: 96.9 F | DIASTOLIC BLOOD PRESSURE: 79 MMHG

## 2024-07-22 DIAGNOSIS — H93.8X9 SENSATION OF PLUGGED EAR, UNSPECIFIED LATERALITY: Primary | ICD-10-CM

## 2024-07-22 PROCEDURE — G2211 COMPLEX E/M VISIT ADD ON: HCPCS | Performed by: FAMILY MEDICINE

## 2024-07-22 PROCEDURE — 99213 OFFICE O/P EST LOW 20 MIN: CPT | Performed by: FAMILY MEDICINE

## 2024-07-22 ASSESSMENT — PATIENT HEALTH QUESTIONNAIRE - PHQ9
SUM OF ALL RESPONSES TO PHQ QUESTIONS 1-9: 3
SUM OF ALL RESPONSES TO PHQ QUESTIONS 1-9: 3
10. IF YOU CHECKED OFF ANY PROBLEMS, HOW DIFFICULT HAVE THESE PROBLEMS MADE IT FOR YOU TO DO YOUR WORK, TAKE CARE OF THINGS AT HOME, OR GET ALONG WITH OTHER PEOPLE: SOMEWHAT DIFFICULT

## 2024-07-22 ASSESSMENT — PAIN SCALES - GENERAL: PAINLEVEL: NO PAIN (0)

## 2024-07-22 NOTE — PROGRESS NOTES
"  Assessment & Plan     Sensation of plugged ear, unspecified laterality      Likely eustachian tube dysfunction associated with some serous fluid buildup in the middle ear canal.  Recommending to start using allergy medication with decongestant, to see if that improves symptoms or not.  Stay hydrated.  If any worsening signs or symptoms noted, notify us back.  Currently no signs of infection.  In future if symptoms are not clearing up, we may need to consider seeing ENT.    The longitudinal plan of care for the diagnosis(es)/condition(s) as documented were addressed during this visit. Due to the added complexity in care, I will continue to support Pat in the subsequent management and with ongoing continuity of care.        BMI  Estimated body mass index is 35.37 kg/m  as calculated from the following:    Height as of this encounter: 1.905 m (6' 3\").    Weight as of this encounter: 128.4 kg (283 lb).             Subjective   Pat is a 58 year old, presenting for the following health issues:  Ear Problem (Left ear feels clogged )        7/22/2024     3:49 PM   Additional Questions   Roomed by Mike HO     History of Present Illness       Reason for visit:  Plugged left ear  Symptom onset:  1-3 days ago    He eats 2-3 servings of fruits and vegetables daily.He consumes 0 sweetened beverage(s) daily.He exercises with enough effort to increase his heart rate 30 to 60 minutes per day.  He exercises with enough effort to increase his heart rate 5 days per week.   He is taking medications regularly.           Left ear feels clogged, has tried over the counter drops. Does not feel like those helped. Noticed 7/21/24 around 11 AM,       Review of Systems  CONSTITUTIONAL: NEGATIVE for fever, chills, change in weight  RESP: NEGATIVE for significant cough or SOB      Objective    /79 (BP Location: Right arm, Patient Position: Sitting, Cuff Size: Adult Large)   Pulse 54   Temp 96.9  F (36.1  C) (Temporal)   Resp 15   Ht " "1.905 m (6' 3\")   Wt 128.4 kg (283 lb)   SpO2 98%   BMI 35.37 kg/m    Body mass index is 35.37 kg/m .  Physical Exam   GENERAL: alert and no distress  HENT: ear canals and TM's normal, nose and mouth without ulcers or lesions  NECK: no adenopathy, no asymmetry, masses, or scars  RESP: lungs clear to auscultation   CV: regular rate and rhythm            Signed Electronically by: Charisse Rivas MD    "

## 2024-08-17 ENCOUNTER — HEALTH MAINTENANCE LETTER (OUTPATIENT)
Age: 59
End: 2024-08-17

## 2024-09-04 ENCOUNTER — MYC MEDICAL ADVICE (OUTPATIENT)
Dept: FAMILY MEDICINE | Facility: CLINIC | Age: 59
End: 2024-09-04
Payer: COMMERCIAL

## 2024-09-04 DIAGNOSIS — H93.13 TINNITUS, BILATERAL: Primary | ICD-10-CM

## 2024-09-04 NOTE — TELEPHONE ENCOUNTER
Please see [employer] message and advise.  Should pt scheduled another visit or be referred to ENT?    Shannan Johnson RN

## (undated) DEVICE — PAD CHUX UNDERPAD 23X24" 7136

## (undated) DEVICE — SOL WATER IRRIG 1000ML BOTTLE 07139-09

## (undated) DEVICE — KIT ENDO FIRST STEP DISINFECTANT 200ML W/POUCH EP-4

## (undated) DEVICE — PREP CHLORAPREP 26ML TINTED ORANGE  260815

## (undated) RX ORDER — FENTANYL CITRATE 50 UG/ML
INJECTION, SOLUTION INTRAMUSCULAR; INTRAVENOUS
Status: DISPENSED
Start: 2022-04-22